# Patient Record
Sex: MALE | Race: WHITE
[De-identification: names, ages, dates, MRNs, and addresses within clinical notes are randomized per-mention and may not be internally consistent; named-entity substitution may affect disease eponyms.]

---

## 2017-01-01 ENCOUNTER — HOSPITAL ENCOUNTER (EMERGENCY)
Dept: HOSPITAL 84 - D.ER | Age: 42
Discharge: HOME | End: 2017-01-01
Payer: SELF-PAY

## 2017-01-01 DIAGNOSIS — N28.89: Primary | ICD-10-CM

## 2017-01-01 DIAGNOSIS — F17.200: ICD-10-CM

## 2017-01-01 LAB
APPEARANCE UR: (no result)
BACTERIA #/AREA URNS HPF: (no result) /HPF
BILIRUB SERPL-MCNC: NEGATIVE MG/DL
COLOR UR: (no result)
GLUCOSE SERPL-MCNC: NEGATIVE MG/DL
KETONES UR STRIP-MCNC: NEGATIVE MG/DL
LEUKOCYTE ESTERASE: (no result)
NITRITE UR-MCNC: NEGATIVE MG/ML
PH UR STRIP: 7 [PH] (ref 5–6)
PROT UR-MCNC: NEGATIVE MG/DL
RBC #/AREA URNS HPF: >50 /HPF (ref 0–5)
SP GR UR STRIP: 1.01 (ref 1–1.02)
SQUAMOUS #/AREA URNS HPF: (no result) /HPF (ref 0–5)
UROBILINOGEN UR-MCNC: NORMAL MG/DL
WBC #/AREA URNS HPF: (no result) /HPF (ref 0–5)

## 2017-03-21 ENCOUNTER — HOSPITAL ENCOUNTER (EMERGENCY)
Dept: HOSPITAL 84 - D.ER | Age: 42
Discharge: HOME | End: 2017-03-21
Payer: SELF-PAY

## 2017-03-21 DIAGNOSIS — I80.02: Primary | ICD-10-CM

## 2017-03-21 LAB
ALBUMIN SERPL-MCNC: 4 G/DL (ref 3.4–5)
ALP SERPL-CCNC: 98 U/L (ref 46–116)
ALT SERPL-CCNC: 22 U/L (ref 10–68)
ANION GAP SERPL CALC-SCNC: 13.5 MMOL/L (ref 8–16)
BASOPHILS NFR BLD AUTO: 0.4 % (ref 0–2)
BILIRUB SERPL-MCNC: 0.28 MG/DL (ref 0.2–1.3)
BUN SERPL-MCNC: 12 MG/DL (ref 7–18)
CALCIUM SERPL-MCNC: 9.1 MG/DL (ref 8.5–10.1)
CHLORIDE SERPL-SCNC: 104 MMOL/L (ref 98–107)
CO2 SERPL-SCNC: 27.8 MMOL/L (ref 21–32)
CREAT SERPL-MCNC: 0.8 MG/DL (ref 0.6–1.3)
CRP SERPL-MCNC: < 0.2 MG/DL (ref 0–0.9)
EOSINOPHIL NFR BLD: 2.1 % (ref 0–7)
ERYTHROCYTE [DISTWIDTH] IN BLOOD BY AUTOMATED COUNT: 12.9 % (ref 11.5–14.5)
GLOBULIN SER-MCNC: 3.9 G/L
GLUCOSE SERPL-MCNC: 88 MG/DL (ref 74–106)
HCT VFR BLD CALC: 45.1 % (ref 42–54)
HGB BLD-MCNC: 14.9 G/DL (ref 13.5–17.5)
IMM GRANULOCYTES NFR BLD: 0.3 % (ref 0–5)
LYMPHOCYTES NFR BLD AUTO: 41.2 % (ref 15–50)
MCH RBC QN AUTO: 30.7 PG (ref 26–34)
MCHC RBC AUTO-ENTMCNC: 33 G/DL (ref 31–37)
MCV RBC: 93 FL (ref 80–100)
MONOCYTES NFR BLD: 9 % (ref 2–11)
NEUTROPHILS NFR BLD AUTO: 47 % (ref 40–80)
OSMOLALITY SERPL CALC.SUM OF ELEC: 279 MOSM/KG (ref 275–300)
PLATELET # BLD: 207 10X3/UL (ref 130–400)
PMV BLD AUTO: 9.5 FL (ref 7.4–10.4)
POTASSIUM SERPL-SCNC: 4.3 MMOL/L (ref 3.5–5.1)
PROT SERPL-MCNC: 7.9 G/DL (ref 6.4–8.2)
RBC # BLD AUTO: 4.85 10X6/UL (ref 4.2–6.1)
SODIUM SERPL-SCNC: 141 MMOL/L (ref 136–145)
WBC # BLD AUTO: 6.8 10X3/UL (ref 4.8–10.8)

## 2017-04-27 ENCOUNTER — HOSPITAL ENCOUNTER (EMERGENCY)
Dept: HOSPITAL 84 - D.ER | Age: 42
Discharge: HOME | End: 2017-04-27
Payer: SELF-PAY

## 2017-04-27 DIAGNOSIS — F17.200: ICD-10-CM

## 2017-04-27 DIAGNOSIS — M54.5: Primary | ICD-10-CM

## 2017-04-27 DIAGNOSIS — S70.01XA: ICD-10-CM
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
## 2017-06-22 ENCOUNTER — HOSPITAL ENCOUNTER (EMERGENCY)
Dept: HOSPITAL 84 - D.ER | Age: 42
Discharge: HOME | End: 2017-06-22
Payer: SELF-PAY

## 2017-06-22 DIAGNOSIS — N23: Primary | ICD-10-CM

## 2017-06-22 LAB
ALBUMIN SERPL-MCNC: 3.3 G/DL (ref 3.4–5)
ALP SERPL-CCNC: 104 U/L (ref 46–116)
ALT SERPL-CCNC: 23 U/L (ref 10–68)
ANION GAP SERPL CALC-SCNC: 12.4 MMOL/L (ref 8–16)
APPEARANCE UR: (no result)
BACTERIA #/AREA URNS HPF: (no result) /HPF
BASOPHILS NFR BLD AUTO: 0.2 % (ref 0–2)
BILIRUB SERPL-MCNC: 0.5 MG/DL (ref 0.2–1.3)
BILIRUB SERPL-MCNC: NEGATIVE MG/DL
BUN SERPL-MCNC: 19 MG/DL (ref 7–18)
CALCIUM SERPL-MCNC: 8.2 MG/DL (ref 8.5–10.1)
CAOX CRY #/AREA URNS HPF: (no result) /HPF
CHLORIDE SERPL-SCNC: 101 MMOL/L (ref 98–107)
CO2 SERPL-SCNC: 25.9 MMOL/L (ref 21–32)
COLOR UR: YELLOW
CREAT SERPL-MCNC: 1 MG/DL (ref 0.6–1.3)
EOSINOPHIL NFR BLD: 1.4 % (ref 0–7)
ERYTHROCYTE [DISTWIDTH] IN BLOOD BY AUTOMATED COUNT: 13.6 % (ref 11.5–14.5)
GLOBULIN SER-MCNC: 3.3 G/L
GLUCOSE SERPL-MCNC: 88 MG/DL (ref 74–106)
GLUCOSE SERPL-MCNC: NEGATIVE MG/DL
HCT VFR BLD CALC: 42.8 % (ref 42–54)
HGB BLD-MCNC: 14.8 G/DL (ref 13.5–17.5)
IMM GRANULOCYTES NFR BLD: 0.1 % (ref 0–5)
KETONES UR STRIP-MCNC: NEGATIVE MG/DL
LEUKOCYTE ESTERASE: (no result)
LYMPHOCYTES NFR BLD AUTO: 26.8 % (ref 15–50)
MCH RBC QN AUTO: 32.3 PG (ref 26–34)
MCHC RBC AUTO-ENTMCNC: 34.6 G/DL (ref 31–37)
MCV RBC: 93.4 FL (ref 80–100)
MONOCYTES NFR BLD: 9.9 % (ref 2–11)
NEUTROPHILS NFR BLD AUTO: 61.6 % (ref 40–80)
NITRITE UR-MCNC: NEGATIVE MG/ML
OSMOLALITY SERPL CALC.SUM OF ELEC: 272 MOSM/KG (ref 275–300)
PH UR STRIP: 5 [PH] (ref 5–6)
PLATELET # BLD: 161 10X3/UL (ref 130–400)
PMV BLD AUTO: 9 FL (ref 7.4–10.4)
POTASSIUM SERPL-SCNC: 3.3 MMOL/L (ref 3.5–5.1)
PROT SERPL-MCNC: 6.6 G/DL (ref 6.4–8.2)
PROT UR-MCNC: NEGATIVE MG/DL
RBC # BLD AUTO: 4.58 10X6/UL (ref 4.2–6.1)
SODIUM SERPL-SCNC: 136 MMOL/L (ref 136–145)
SP GR UR STRIP: 1.03 (ref 1–1.02)
SQUAMOUS #/AREA URNS HPF: (no result) /HPF (ref 0–5)
UROBILINOGEN UR-MCNC: NORMAL MG/DL
WBC # BLD AUTO: 9.8 10X3/UL (ref 4.8–10.8)
WBC #/AREA URNS HPF: (no result) /HPF (ref 0–5)

## 2017-07-17 ENCOUNTER — HOSPITAL ENCOUNTER (EMERGENCY)
Dept: HOSPITAL 84 - D.ER | Age: 42
Discharge: HOME | End: 2017-07-17
Payer: SELF-PAY

## 2017-07-17 DIAGNOSIS — F17.200: ICD-10-CM

## 2017-07-17 DIAGNOSIS — R31.9: ICD-10-CM

## 2017-07-17 DIAGNOSIS — K59.00: Primary | ICD-10-CM

## 2017-07-17 LAB
ALBUMIN SERPL-MCNC: 3.2 G/DL (ref 3.4–5)
ALP SERPL-CCNC: 96 U/L (ref 46–116)
ALT SERPL-CCNC: 31 U/L (ref 10–68)
AMYLASE SERPL-CCNC: 42 U/L (ref 25–115)
ANION GAP SERPL CALC-SCNC: 12 MMOL/L (ref 8–16)
APPEARANCE UR: (no result)
BACTERIA #/AREA URNS HPF: (no result) /HPF
BASOPHILS NFR BLD AUTO: 0.3 % (ref 0–2)
BILIRUB SERPL-MCNC: 0.24 MG/DL (ref 0.2–1.3)
BILIRUB SERPL-MCNC: NEGATIVE MG/DL
BUN SERPL-MCNC: 18 MG/DL (ref 7–18)
CALCIUM SERPL-MCNC: 8.7 MG/DL (ref 8.5–10.1)
CAOX CRY #/AREA URNS HPF: (no result) /HPF
CHLORIDE SERPL-SCNC: 107 MMOL/L (ref 98–107)
CO2 SERPL-SCNC: 27.1 MMOL/L (ref 21–32)
COLOR UR: YELLOW
CREAT SERPL-MCNC: 0.9 MG/DL (ref 0.6–1.3)
EOSINOPHIL NFR BLD: 1.6 % (ref 0–7)
ERYTHROCYTE [DISTWIDTH] IN BLOOD BY AUTOMATED COUNT: 13 % (ref 11.5–14.5)
GLOBULIN SER-MCNC: 3.2 G/L
GLUCOSE SERPL-MCNC: 112 MG/DL (ref 74–106)
GLUCOSE SERPL-MCNC: NEGATIVE MG/DL
HCT VFR BLD CALC: 41.8 % (ref 42–54)
HGB BLD-MCNC: 14 G/DL (ref 13.5–17.5)
IMM GRANULOCYTES NFR BLD: 0.2 % (ref 0–5)
KETONES UR STRIP-MCNC: NEGATIVE MG/DL
LEUKOCYTE ESTERASE: NEGATIVE
LIPASE SERPL-CCNC: 108 U/L (ref 73–393)
LYMPHOCYTES NFR BLD AUTO: 19.8 % (ref 15–50)
MCH RBC QN AUTO: 31.7 PG (ref 26–34)
MCHC RBC AUTO-ENTMCNC: 33.5 G/DL (ref 31–37)
MCV RBC: 94.8 FL (ref 80–100)
MONOCYTES NFR BLD: 8 % (ref 2–11)
NEUTROPHILS NFR BLD AUTO: 70.1 % (ref 40–80)
NITRITE UR-MCNC: NEGATIVE MG/ML
OSMOLALITY SERPL CALC.SUM OF ELEC: 285 MOSM/KG (ref 275–300)
PH UR STRIP: 6 [PH] (ref 5–6)
PLATELET # BLD: 161 10X3/UL (ref 130–400)
PMV BLD AUTO: 9.5 FL (ref 7.4–10.4)
POTASSIUM SERPL-SCNC: 4.1 MMOL/L (ref 3.5–5.1)
PROT SERPL-MCNC: 6.4 G/DL (ref 6.4–8.2)
PROT UR-MCNC: NEGATIVE MG/DL
RBC # BLD AUTO: 4.41 10X6/UL (ref 4.2–6.1)
RBC #/AREA URNS HPF: >50 /HPF (ref 0–5)
SODIUM SERPL-SCNC: 142 MMOL/L (ref 136–145)
SP GR UR STRIP: 1.01 (ref 1–1.02)
SQUAMOUS #/AREA URNS HPF: (no result) /HPF (ref 0–5)
UROBILINOGEN UR-MCNC: NORMAL MG/DL
WBC # BLD AUTO: 6.2 10X3/UL (ref 4.8–10.8)
WBC #/AREA URNS HPF: (no result) /HPF (ref 0–5)

## 2017-07-25 ENCOUNTER — HOSPITAL ENCOUNTER (EMERGENCY)
Dept: HOSPITAL 84 - D.ER | Age: 42
Discharge: HOME | End: 2017-07-25
Payer: MEDICAID

## 2017-07-25 DIAGNOSIS — R10.9: Primary | ICD-10-CM

## 2017-07-25 DIAGNOSIS — R60.0: ICD-10-CM

## 2017-08-12 ENCOUNTER — HOSPITAL ENCOUNTER (EMERGENCY)
Dept: HOSPITAL 84 - D.ER | Age: 42
Discharge: HOME | End: 2017-08-12
Payer: MEDICAID

## 2017-08-12 DIAGNOSIS — F17.200: ICD-10-CM

## 2017-08-12 DIAGNOSIS — R10.9: Primary | ICD-10-CM

## 2017-08-12 DIAGNOSIS — K40.90: ICD-10-CM

## 2017-08-24 ENCOUNTER — HOSPITAL ENCOUNTER (EMERGENCY)
Dept: HOSPITAL 84 - D.ER | Age: 42
Discharge: HOME | End: 2017-08-24
Payer: MEDICAID

## 2017-08-24 DIAGNOSIS — K40.90: Primary | ICD-10-CM

## 2017-08-24 DIAGNOSIS — R10.30: ICD-10-CM

## 2017-08-24 DIAGNOSIS — F17.200: ICD-10-CM

## 2017-08-28 ENCOUNTER — HOSPITAL ENCOUNTER (EMERGENCY)
Dept: HOSPITAL 84 - D.ER | Age: 42
Discharge: HOME | End: 2017-08-28
Payer: MEDICAID

## 2017-08-28 DIAGNOSIS — F17.200: ICD-10-CM

## 2017-08-28 DIAGNOSIS — I82.402: Primary | ICD-10-CM

## 2017-08-28 LAB
ANION GAP SERPL CALC-SCNC: 14.3 MMOL/L (ref 8–16)
APTT BLD: 28.1 SECONDS (ref 22.8–39.4)
BASOPHILS NFR BLD AUTO: 0.4 % (ref 0–2)
BUN SERPL-MCNC: 15 MG/DL (ref 7–18)
CALCIUM SERPL-MCNC: 8.6 MG/DL (ref 8.5–10.1)
CHLORIDE SERPL-SCNC: 105 MMOL/L (ref 98–107)
CO2 SERPL-SCNC: 24.5 MMOL/L (ref 21–32)
CREAT SERPL-MCNC: 0.7 MG/DL (ref 0.6–1.3)
EOSINOPHIL NFR BLD: 2.4 % (ref 0–7)
ERYTHROCYTE [DISTWIDTH] IN BLOOD BY AUTOMATED COUNT: 12.6 % (ref 11.5–14.5)
GLUCOSE SERPL-MCNC: 96 MG/DL (ref 74–106)
HCT VFR BLD CALC: 35.2 % (ref 42–54)
HGB BLD-MCNC: 11.7 G/DL (ref 13.5–17.5)
IMM GRANULOCYTES NFR BLD: 0.2 % (ref 0–5)
INR PPP: 0.98 (ref 0.85–1.17)
LYMPHOCYTES NFR BLD AUTO: 37.9 % (ref 15–50)
MCH RBC QN AUTO: 31.2 PG (ref 26–34)
MCHC RBC AUTO-ENTMCNC: 33.2 G/DL (ref 31–37)
MCV RBC: 93.9 FL (ref 80–100)
MONOCYTES NFR BLD: 8.3 % (ref 2–11)
NEUTROPHILS NFR BLD AUTO: 50.8 % (ref 40–80)
OSMOLALITY SERPL CALC.SUM OF ELEC: 279 MOSM/KG (ref 275–300)
PLATELET # BLD: 143 10X3/UL (ref 130–400)
PMV BLD AUTO: 9.1 FL (ref 7.4–10.4)
POTASSIUM SERPL-SCNC: 3.8 MMOL/L (ref 3.5–5.1)
PROTHROMBIN TIME: 12.8 SECONDS (ref 11.6–15)
RBC # BLD AUTO: 3.75 10X6/UL (ref 4.2–6.1)
SODIUM SERPL-SCNC: 140 MMOL/L (ref 136–145)
WBC # BLD AUTO: 5.5 10X3/UL (ref 4.8–10.8)

## 2017-09-23 ENCOUNTER — HOSPITAL ENCOUNTER (EMERGENCY)
Dept: HOSPITAL 84 - D.ER | Age: 42
Discharge: HOME | End: 2017-09-23
Payer: MEDICAID

## 2017-09-23 DIAGNOSIS — K40.90: Primary | ICD-10-CM

## 2017-09-23 DIAGNOSIS — F17.200: ICD-10-CM

## 2017-10-04 ENCOUNTER — HOSPITAL ENCOUNTER (EMERGENCY)
Dept: HOSPITAL 84 - D.ER | Age: 42
Discharge: HOME | End: 2017-10-04
Payer: MEDICAID

## 2017-10-04 DIAGNOSIS — S20.219A: Primary | ICD-10-CM

## 2017-10-04 DIAGNOSIS — F17.200: ICD-10-CM
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
## 2017-10-04 DIAGNOSIS — R07.89: ICD-10-CM
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
## 2017-10-07 ENCOUNTER — HOSPITAL ENCOUNTER (EMERGENCY)
Dept: HOSPITAL 84 - D.ER | Age: 42
Discharge: HOME | End: 2017-10-07
Payer: MEDICAID

## 2017-10-07 ENCOUNTER — HOSPITAL ENCOUNTER (EMERGENCY)
Dept: HOSPITAL 84 - D.ER | Age: 42
LOS: 1 days | Discharge: HOME | End: 2017-10-08
Payer: MEDICAID

## 2017-10-07 DIAGNOSIS — D68.51: ICD-10-CM

## 2017-10-07 DIAGNOSIS — K21.9: Primary | ICD-10-CM

## 2017-10-07 DIAGNOSIS — F17.200: ICD-10-CM

## 2017-10-07 DIAGNOSIS — K59.00: Primary | ICD-10-CM

## 2017-10-07 LAB
AMYLASE SERPL-CCNC: 25 U/L (ref 25–115)
ANION GAP SERPL CALC-SCNC: 15 MMOL/L (ref 8–16)
BASOPHILS NFR BLD AUTO: 0.1 % (ref 0–2)
BUN SERPL-MCNC: 8 MG/DL (ref 7–18)
CALCIUM SERPL-MCNC: 8.8 MG/DL (ref 8.5–10.1)
CHLORIDE SERPL-SCNC: 107 MMOL/L (ref 98–107)
CO2 SERPL-SCNC: 23.4 MMOL/L (ref 21–32)
CREAT SERPL-MCNC: 0.9 MG/DL (ref 0.6–1.3)
EOSINOPHIL NFR BLD: 3.3 % (ref 0–7)
ERYTHROCYTE [DISTWIDTH] IN BLOOD BY AUTOMATED COUNT: 12.9 % (ref 11.5–14.5)
GLUCOSE SERPL-MCNC: 87 MG/DL (ref 74–106)
HCT VFR BLD CALC: 39.2 % (ref 42–54)
HGB BLD-MCNC: 13.2 G/DL (ref 13.5–17.5)
IMM GRANULOCYTES NFR BLD: 0.1 % (ref 0–5)
LIPASE SERPL-CCNC: 86 U/L (ref 73–393)
LYMPHOCYTES NFR BLD AUTO: 22.5 % (ref 15–50)
MCH RBC QN AUTO: 31.1 PG (ref 26–34)
MCHC RBC AUTO-ENTMCNC: 33.7 G/DL (ref 31–37)
MCV RBC: 92.2 FL (ref 80–100)
MONOCYTES NFR BLD: 8.6 % (ref 2–11)
NEUTROPHILS NFR BLD AUTO: 65.4 % (ref 40–80)
OSMOLALITY SERPL CALC.SUM OF ELEC: 279 MOSM/KG (ref 275–300)
PLATELET # BLD: 151 10X3/UL (ref 130–400)
PMV BLD AUTO: 9 FL (ref 7.4–10.4)
POTASSIUM SERPL-SCNC: 3.4 MMOL/L (ref 3.5–5.1)
RBC # BLD AUTO: 4.25 10X6/UL (ref 4.2–6.1)
SODIUM SERPL-SCNC: 142 MMOL/L (ref 136–145)
WBC # BLD AUTO: 7.2 10X3/UL (ref 4.8–10.8)

## 2017-10-08 LAB
BASOPHILS NFR BLD AUTO: 0.3 % (ref 0–2)
EOSINOPHIL NFR BLD: 2.9 % (ref 0–7)
ERYTHROCYTE [DISTWIDTH] IN BLOOD BY AUTOMATED COUNT: 12.7 % (ref 11.5–14.5)
HCT VFR BLD CALC: 40.4 % (ref 42–54)
HGB BLD-MCNC: 13.5 G/DL (ref 13.5–17.5)
IMM GRANULOCYTES NFR BLD: 0.2 % (ref 0–5)
LYMPHOCYTES NFR BLD AUTO: 28 % (ref 15–50)
MCH RBC QN AUTO: 31 PG (ref 26–34)
MCHC RBC AUTO-ENTMCNC: 33.4 G/DL (ref 31–37)
MCV RBC: 92.9 FL (ref 80–100)
MONOCYTES NFR BLD: 7.6 % (ref 2–11)
NEUTROPHILS NFR BLD AUTO: 61 % (ref 40–80)
PLATELET # BLD: 142 10X3/UL (ref 130–400)
PMV BLD AUTO: 9.1 FL (ref 7.4–10.4)
RBC # BLD AUTO: 4.35 10X6/UL (ref 4.2–6.1)
WBC # BLD AUTO: 6.3 10X3/UL (ref 4.8–10.8)

## 2017-11-28 ENCOUNTER — HOSPITAL ENCOUNTER (EMERGENCY)
Dept: HOSPITAL 84 - D.ER | Age: 42
LOS: 1 days | Discharge: HOME | End: 2017-11-29
Payer: MEDICAID

## 2017-11-28 DIAGNOSIS — N39.0: ICD-10-CM

## 2017-11-28 DIAGNOSIS — F17.200: ICD-10-CM

## 2017-11-28 DIAGNOSIS — R10.9: Primary | ICD-10-CM

## 2017-11-28 DIAGNOSIS — N23: ICD-10-CM

## 2017-11-28 LAB
ALBUMIN SERPL-MCNC: 3.8 G/DL (ref 3.4–5)
ALP SERPL-CCNC: 127 U/L (ref 46–116)
ALT SERPL-CCNC: 23 U/L (ref 10–68)
ANION GAP SERPL CALC-SCNC: 14 MMOL/L (ref 8–16)
APPEARANCE UR: (no result)
BACTERIA #/AREA URNS HPF: (no result) /HPF
BASOPHILS NFR BLD AUTO: 0.7 % (ref 0–2)
BILIRUB SERPL-MCNC: 0.19 MG/DL (ref 0.2–1.3)
BILIRUB SERPL-MCNC: NEGATIVE MG/DL
BUN SERPL-MCNC: 15 MG/DL (ref 7–18)
CALCIUM SERPL-MCNC: 9.1 MG/DL (ref 8.5–10.1)
CHLORIDE SERPL-SCNC: 102 MMOL/L (ref 98–107)
CO2 SERPL-SCNC: 26.6 MMOL/L (ref 21–32)
COLOR UR: (no result)
CREAT SERPL-MCNC: 0.8 MG/DL (ref 0.6–1.3)
EOSINOPHIL NFR BLD: 2 % (ref 0–7)
ERYTHROCYTE [DISTWIDTH] IN BLOOD BY AUTOMATED COUNT: 13 % (ref 11.5–14.5)
GLOBULIN SER-MCNC: 4 G/L
GLUCOSE SERPL-MCNC: 56 MG/DL (ref 74–106)
GLUCOSE SERPL-MCNC: NEGATIVE MG/DL
HCT VFR BLD CALC: 42.2 % (ref 42–54)
HGB BLD-MCNC: 14.1 G/DL (ref 13.5–17.5)
IMM GRANULOCYTES NFR BLD: 0.1 % (ref 0–5)
KETONES UR STRIP-MCNC: NEGATIVE MG/DL
LYMPHOCYTES NFR BLD AUTO: 29.5 % (ref 15–50)
MCH RBC QN AUTO: 31.2 PG (ref 26–34)
MCHC RBC AUTO-ENTMCNC: 33.4 G/DL (ref 31–37)
MCV RBC: 93.4 FL (ref 80–100)
MONOCYTES NFR BLD: 8.9 % (ref 2–11)
NEUTROPHILS NFR BLD AUTO: 58.8 % (ref 40–80)
NITRITE UR-MCNC: NEGATIVE MG/ML
OSMOLALITY SERPL CALC.SUM OF ELEC: 276 MOSM/KG (ref 275–300)
PH UR STRIP: 5 [PH] (ref 5–6)
PLATELET # BLD: 204 10X3/UL (ref 130–400)
PMV BLD AUTO: 9 FL (ref 7.4–10.4)
POTASSIUM SERPL-SCNC: 3.6 MMOL/L (ref 3.5–5.1)
PROT SERPL-MCNC: 7.8 G/DL (ref 6.4–8.2)
PROT UR-MCNC: (no result) MG/DL
RBC # BLD AUTO: 4.52 10X6/UL (ref 4.2–6.1)
RBC #/AREA URNS HPF: >50 /HPF (ref 0–5)
SODIUM SERPL-SCNC: 139 MMOL/L (ref 136–145)
SP GR UR STRIP: 1.02 (ref 1–1.02)
UROBILINOGEN UR-MCNC: NORMAL MG/DL
WBC # BLD AUTO: 6.9 10X3/UL (ref 4.8–10.8)
WBC #/AREA URNS HPF: (no result) /HPF (ref 0–5)

## 2017-12-31 ENCOUNTER — HOSPITAL ENCOUNTER (EMERGENCY)
Dept: HOSPITAL 84 - D.ER | Age: 42
Discharge: HOME | End: 2017-12-31
Payer: MEDICAID

## 2017-12-31 DIAGNOSIS — D68.51: ICD-10-CM

## 2017-12-31 DIAGNOSIS — K40.90: Primary | ICD-10-CM

## 2017-12-31 DIAGNOSIS — F17.200: ICD-10-CM

## 2017-12-31 LAB
APPEARANCE UR: CLEAR
BILIRUB SERPL-MCNC: NEGATIVE MG/DL
COLOR UR: YELLOW
GLUCOSE SERPL-MCNC: NEGATIVE MG/DL
KETONES UR STRIP-MCNC: NEGATIVE MG/DL
NITRITE UR-MCNC: NEGATIVE MG/ML
PH UR STRIP: 7 [PH] (ref 5–6)
PROT UR-MCNC: NEGATIVE MG/DL
SP GR UR STRIP: 1.01 (ref 1–1.02)
UROBILINOGEN UR-MCNC: NORMAL MG/DL

## 2018-01-02 ENCOUNTER — HOSPITAL ENCOUNTER (EMERGENCY)
Dept: HOSPITAL 84 - D.ER | Age: 43
Discharge: HOME | End: 2018-01-02
Payer: MEDICAID

## 2018-01-02 DIAGNOSIS — R10.32: ICD-10-CM

## 2018-01-02 DIAGNOSIS — R10.9: Primary | ICD-10-CM

## 2018-01-02 DIAGNOSIS — K40.90: ICD-10-CM

## 2018-01-02 DIAGNOSIS — F17.200: ICD-10-CM

## 2018-01-02 LAB
ALBUMIN SERPL-MCNC: 3.9 G/DL (ref 3.4–5)
ALP SERPL-CCNC: 121 U/L (ref 46–116)
ALT SERPL-CCNC: 19 U/L (ref 10–68)
ANION GAP SERPL CALC-SCNC: 15.6 MMOL/L (ref 8–16)
BASOPHILS NFR BLD AUTO: 0.1 % (ref 0–2)
BILIRUB SERPL-MCNC: 0.66 MG/DL (ref 0.2–1.3)
BUN SERPL-MCNC: 9 MG/DL (ref 7–18)
CALCIUM SERPL-MCNC: 9.7 MG/DL (ref 8.5–10.1)
CHLORIDE SERPL-SCNC: 102 MMOL/L (ref 98–107)
CO2 SERPL-SCNC: 26.2 MMOL/L (ref 21–32)
CREAT SERPL-MCNC: 0.9 MG/DL (ref 0.6–1.3)
EOSINOPHIL NFR BLD: 0.7 % (ref 0–7)
ERYTHROCYTE [DISTWIDTH] IN BLOOD BY AUTOMATED COUNT: 13 % (ref 11.5–14.5)
GLOBULIN SER-MCNC: 4.2 G/L
GLUCOSE SERPL-MCNC: 97 MG/DL (ref 74–106)
HCT VFR BLD CALC: 43.3 % (ref 42–54)
HGB BLD-MCNC: 14.8 G/DL (ref 13.5–17.5)
IMM GRANULOCYTES NFR BLD: 0.2 % (ref 0–5)
INR PPP: 1 (ref 0.85–1.17)
LYMPHOCYTES NFR BLD AUTO: 25 % (ref 15–50)
MCH RBC QN AUTO: 30.6 PG (ref 26–34)
MCHC RBC AUTO-ENTMCNC: 34.2 G/DL (ref 31–37)
MCV RBC: 89.5 FL (ref 80–100)
MONOCYTES NFR BLD: 7.6 % (ref 2–11)
NEUTROPHILS NFR BLD AUTO: 66.4 % (ref 40–80)
OSMOLALITY SERPL CALC.SUM OF ELEC: 277 MOSM/KG (ref 275–300)
PLATELET # BLD: 233 10X3/UL (ref 130–400)
PMV BLD AUTO: 8.7 FL (ref 7.4–10.4)
POTASSIUM SERPL-SCNC: 3.8 MMOL/L (ref 3.5–5.1)
PROT SERPL-MCNC: 8.1 G/DL (ref 6.4–8.2)
PROTHROMBIN TIME: 12.8 SECONDS (ref 11.6–15)
RBC # BLD AUTO: 4.84 10X6/UL (ref 4.2–6.1)
SODIUM SERPL-SCNC: 140 MMOL/L (ref 136–145)
WBC # BLD AUTO: 8.8 10X3/UL (ref 4.8–10.8)

## 2018-01-06 ENCOUNTER — HOSPITAL ENCOUNTER (EMERGENCY)
Dept: HOSPITAL 84 - D.ER | Age: 43
Discharge: HOME | End: 2018-01-06
Payer: MEDICAID

## 2018-01-06 DIAGNOSIS — F17.200: ICD-10-CM

## 2018-01-06 DIAGNOSIS — N39.0: Primary | ICD-10-CM

## 2018-01-06 LAB
ALBUMIN SERPL-MCNC: 3.5 G/DL (ref 3.4–5)
ALP SERPL-CCNC: 111 U/L (ref 46–116)
ALT SERPL-CCNC: 16 U/L (ref 10–68)
AMPHETAMINES UR QL SCN: POSITIVE QUAL
ANION GAP SERPL CALC-SCNC: 14.6 MMOL/L (ref 8–16)
APPEARANCE UR: (no result)
BACTERIA #/AREA URNS HPF: (no result) /HPF
BARBITURATES UR QL SCN: NEGATIVE QUAL
BASOPHILS NFR BLD AUTO: 0.3 % (ref 0–2)
BENZODIAZ UR QL SCN: NEGATIVE QUAL
BILIRUB SERPL-MCNC: 0.32 MG/DL (ref 0.2–1.3)
BILIRUB SERPL-MCNC: NEGATIVE MG/DL
BUN SERPL-MCNC: 10 MG/DL (ref 7–18)
BZE UR QL SCN: NEGATIVE QUAL
CALCIUM SERPL-MCNC: 9.2 MG/DL (ref 8.5–10.1)
CANNABINOIDS UR QL SCN: POSITIVE QUAL
CHLORIDE SERPL-SCNC: 104 MMOL/L (ref 98–107)
CO2 SERPL-SCNC: 25.4 MMOL/L (ref 21–32)
COLOR UR: (no result)
CREAT SERPL-MCNC: 0.8 MG/DL (ref 0.6–1.3)
EOSINOPHIL NFR BLD: 0.9 % (ref 0–7)
ERYTHROCYTE [DISTWIDTH] IN BLOOD BY AUTOMATED COUNT: 13 % (ref 11.5–14.5)
GLOBULIN SER-MCNC: 4.1 G/L
GLUCOSE SERPL-MCNC: 92 MG/DL (ref 74–106)
GLUCOSE SERPL-MCNC: NEGATIVE MG/DL
HCT VFR BLD CALC: 40.6 % (ref 42–54)
HGB BLD-MCNC: 13.8 G/DL (ref 13.5–17.5)
IMM GRANULOCYTES NFR BLD: 0.1 % (ref 0–5)
INR PPP: 1.04 (ref 0.85–1.17)
KETONES UR STRIP-MCNC: NEGATIVE MG/DL
LYMPHOCYTES NFR BLD AUTO: 26.1 % (ref 15–50)
MCH RBC QN AUTO: 30.5 PG (ref 26–34)
MCHC RBC AUTO-ENTMCNC: 34 G/DL (ref 31–37)
MCV RBC: 89.6 FL (ref 80–100)
MONOCYTES NFR BLD: 8.7 % (ref 2–11)
NEUTROPHILS NFR BLD AUTO: 63.9 % (ref 40–80)
NITRITE UR-MCNC: POSITIVE MG/ML
OPIATES UR QL SCN: POSITIVE QUAL
OSMOLALITY SERPL CALC.SUM OF ELEC: 277 MOSM/KG (ref 275–300)
PCP UR QL SCN: NEGATIVE QUAL
PH UR STRIP: 5 [PH] (ref 5–6)
PLATELET # BLD: 220 10X3/UL (ref 130–400)
PMV BLD AUTO: 8.8 FL (ref 7.4–10.4)
POTASSIUM SERPL-SCNC: 4 MMOL/L (ref 3.5–5.1)
PROT SERPL-MCNC: 7.6 G/DL (ref 6.4–8.2)
PROT UR-MCNC: (no result) MG/DL
PROTHROMBIN TIME: 13.2 SECONDS (ref 11.6–15)
RBC # BLD AUTO: 4.53 10X6/UL (ref 4.2–6.1)
RBC #/AREA URNS HPF: >50 /HPF (ref 0–5)
SODIUM SERPL-SCNC: 140 MMOL/L (ref 136–145)
SP GR UR STRIP: 1.02 (ref 1–1.02)
SQUAMOUS #/AREA URNS HPF: (no result) /HPF (ref 0–5)
UROBILINOGEN UR-MCNC: 8 MG/DL
WBC # BLD AUTO: 7.5 10X3/UL (ref 4.8–10.8)
WBC #/AREA URNS HPF: (no result) /HPF (ref 0–5)

## 2018-01-26 ENCOUNTER — HOSPITAL ENCOUNTER (EMERGENCY)
Dept: HOSPITAL 84 - D.ER | Age: 43
Discharge: HOME | End: 2018-01-26
Payer: MEDICAID

## 2018-01-26 DIAGNOSIS — R31.9: ICD-10-CM

## 2018-01-26 DIAGNOSIS — R10.9: Primary | ICD-10-CM

## 2018-01-26 DIAGNOSIS — F17.200: ICD-10-CM

## 2018-01-26 LAB
ALBUMIN SERPL-MCNC: 3.8 G/DL (ref 3.4–5)
ALP SERPL-CCNC: 97 U/L (ref 46–116)
ALT SERPL-CCNC: 16 U/L (ref 10–68)
AMORPHOUS SEDIMENT: (no result) /LPF
AMYLASE SERPL-CCNC: 43 U/L (ref 25–115)
ANION GAP SERPL CALC-SCNC: 12.2 MMOL/L (ref 8–16)
APPEARANCE UR: (no result)
BACTERIA #/AREA URNS HPF: (no result) /HPF
BASOPHILS NFR BLD AUTO: 0.1 % (ref 0–2)
BILIRUB SERPL-MCNC: 0.42 MG/DL (ref 0.2–1.3)
BILIRUB SERPL-MCNC: NEGATIVE MG/DL
BUN SERPL-MCNC: 12 MG/DL (ref 7–18)
CALCIUM SERPL-MCNC: 9.3 MG/DL (ref 8.5–10.1)
CAOX CRY #/AREA URNS HPF: (no result) /HPF
CHLORIDE SERPL-SCNC: 104 MMOL/L (ref 98–107)
CO2 SERPL-SCNC: 28.6 MMOL/L (ref 21–32)
COLOR UR: (no result)
CREAT SERPL-MCNC: 1 MG/DL (ref 0.6–1.3)
EOSINOPHIL NFR BLD: 0.4 % (ref 0–7)
ERYTHROCYTE [DISTWIDTH] IN BLOOD BY AUTOMATED COUNT: 13.1 % (ref 11.5–14.5)
GLOBULIN SER-MCNC: 3.6 G/L
GLUCOSE SERPL-MCNC: 107 MG/DL (ref 74–106)
GLUCOSE SERPL-MCNC: NEGATIVE MG/DL
GRAN CASTS #/AREA URNS LPF: (no result) /LPF
HCT VFR BLD CALC: 43.5 % (ref 42–54)
HGB BLD-MCNC: 14.8 G/DL (ref 13.5–17.5)
IMM GRANULOCYTES NFR BLD: 0.3 % (ref 0–5)
KETONES UR STRIP-MCNC: NEGATIVE MG/DL
LIPASE SERPL-CCNC: 85 U/L (ref 73–393)
LYMPHOCYTES NFR BLD AUTO: 28.9 % (ref 15–50)
MCH RBC QN AUTO: 30.7 PG (ref 26–34)
MCHC RBC AUTO-ENTMCNC: 34 G/DL (ref 31–37)
MCV RBC: 90.2 FL (ref 80–100)
MONOCYTES NFR BLD: 7 % (ref 2–11)
MUCOUS THREADS #/AREA URNS LPF: (no result) /LPF
NEUTROPHILS NFR BLD AUTO: 63.3 % (ref 40–80)
NITRITE UR-MCNC: NEGATIVE MG/ML
OSMOLALITY SERPL CALC.SUM OF ELEC: 280 MOSM/KG (ref 275–300)
PH UR STRIP: 7 [PH] (ref 5–6)
PLATELET # BLD: 156 10X3/UL (ref 130–400)
PMV BLD AUTO: 8.7 FL (ref 7.4–10.4)
POTASSIUM SERPL-SCNC: 3.8 MMOL/L (ref 3.5–5.1)
PROT SERPL-MCNC: 7.4 G/DL (ref 6.4–8.2)
PROT UR-MCNC: NEGATIVE MG/DL
RBC # BLD AUTO: 4.82 10X6/UL (ref 4.2–6.1)
RBC #/AREA URNS HPF: >50 /HPF (ref 0–5)
SODIUM SERPL-SCNC: 141 MMOL/L (ref 136–145)
SP GR UR STRIP: 1.01 (ref 1–1.02)
SQUAMOUS #/AREA URNS HPF: (no result) /HPF (ref 0–5)
UROBILINOGEN UR-MCNC: NORMAL MG/DL
WBC # BLD AUTO: 7.8 10X3/UL (ref 4.8–10.8)
WBC #/AREA URNS HPF: (no result) /HPF (ref 0–5)

## 2018-03-18 ENCOUNTER — HOSPITAL ENCOUNTER (EMERGENCY)
Dept: HOSPITAL 84 - D.ER | Age: 43
Discharge: HOME | End: 2018-03-18
Payer: MEDICAID

## 2018-03-18 DIAGNOSIS — I82.402: ICD-10-CM

## 2018-03-18 DIAGNOSIS — M79.605: Primary | ICD-10-CM

## 2018-04-10 ENCOUNTER — HOSPITAL ENCOUNTER (EMERGENCY)
Dept: HOSPITAL 84 - D.ER | Age: 43
Discharge: HOME | End: 2018-04-10
Payer: MEDICAID

## 2018-04-10 DIAGNOSIS — K02.9: Primary | ICD-10-CM

## 2018-04-10 DIAGNOSIS — F17.200: ICD-10-CM

## 2018-04-10 DIAGNOSIS — K04.7: ICD-10-CM

## 2018-04-10 DIAGNOSIS — R22.9: ICD-10-CM

## 2018-04-10 DIAGNOSIS — K08.89: ICD-10-CM

## 2018-04-18 ENCOUNTER — HOSPITAL ENCOUNTER (EMERGENCY)
Dept: HOSPITAL 84 - D.ER | Age: 43
Discharge: HOME | End: 2018-04-18
Payer: MEDICAID
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
## 2018-04-18 DIAGNOSIS — F17.200: ICD-10-CM
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
## 2018-04-18 DIAGNOSIS — S83.92XA: Primary | ICD-10-CM

## 2018-05-31 ENCOUNTER — HOSPITAL ENCOUNTER (EMERGENCY)
Dept: HOSPITAL 84 - D.ER | Age: 43
Discharge: HOME | End: 2018-05-31
Payer: SELF-PAY

## 2018-05-31 DIAGNOSIS — L03.116: Primary | ICD-10-CM

## 2018-05-31 LAB
ALBUMIN SERPL-MCNC: 3.4 G/DL (ref 3.4–5)
ALP SERPL-CCNC: 96 U/L (ref 46–116)
ALT SERPL-CCNC: 29 U/L (ref 10–68)
ANION GAP SERPL CALC-SCNC: 16.7 MMOL/L (ref 8–16)
BASOPHILS NFR BLD AUTO: 0.5 % (ref 0–2)
BILIRUB SERPL-MCNC: 0.2 MG/DL (ref 0.2–1.3)
BUN SERPL-MCNC: 19 MG/DL (ref 7–18)
CALCIUM SERPL-MCNC: 8.3 MG/DL (ref 8.5–10.1)
CHLORIDE SERPL-SCNC: 106 MMOL/L (ref 98–107)
CO2 SERPL-SCNC: 23 MMOL/L (ref 21–32)
CREAT SERPL-MCNC: 0.8 MG/DL (ref 0.6–1.3)
CRP SERPL-MCNC: < 0.2 MG/DL (ref 0–0.9)
EOSINOPHIL NFR BLD: 4.5 % (ref 0–7)
ERYTHROCYTE [DISTWIDTH] IN BLOOD BY AUTOMATED COUNT: 12.7 % (ref 11.5–14.5)
GLOBULIN SER-MCNC: 3.6 G/L
GLUCOSE SERPL-MCNC: 94 MG/DL (ref 74–106)
HCT VFR BLD CALC: 39.3 % (ref 42–54)
HGB BLD-MCNC: 13.3 G/DL (ref 13.5–17.5)
IMM GRANULOCYTES NFR BLD: 0.2 % (ref 0–5)
LYMPHOCYTES NFR BLD AUTO: 35.9 % (ref 15–50)
MCH RBC QN AUTO: 31.3 PG (ref 26–34)
MCHC RBC AUTO-ENTMCNC: 33.8 G/DL (ref 31–37)
MCV RBC: 92.5 FL (ref 80–100)
MONOCYTES NFR BLD: 7.7 % (ref 2–11)
NEUTROPHILS NFR BLD AUTO: 51.2 % (ref 40–80)
OSMOLALITY SERPL CALC.SUM OF ELEC: 284 MOSM/KG (ref 275–300)
PLATELET # BLD: 163 10X3/UL (ref 130–400)
PMV BLD AUTO: 8.9 FL (ref 7.4–10.4)
POTASSIUM SERPL-SCNC: 3.7 MMOL/L (ref 3.5–5.1)
PROT SERPL-MCNC: 7 G/DL (ref 6.4–8.2)
RBC # BLD AUTO: 4.25 10X6/UL (ref 4.2–6.1)
SODIUM SERPL-SCNC: 142 MMOL/L (ref 136–145)
WBC # BLD AUTO: 5.6 10X3/UL (ref 4.8–10.8)

## 2018-08-06 ENCOUNTER — HOSPITAL ENCOUNTER (EMERGENCY)
Dept: HOSPITAL 84 - D.ER | Age: 43
Discharge: HOME | End: 2018-08-06
Payer: MEDICAID

## 2018-08-06 VITALS — HEIGHT: 72 IN | BODY MASS INDEX: 23.7 KG/M2 | WEIGHT: 175 LBS

## 2018-08-06 VITALS — DIASTOLIC BLOOD PRESSURE: 78 MMHG | SYSTOLIC BLOOD PRESSURE: 122 MMHG

## 2018-08-06 DIAGNOSIS — K04.7: ICD-10-CM

## 2018-08-06 DIAGNOSIS — K02.9: Primary | ICD-10-CM

## 2018-08-06 DIAGNOSIS — F17.200: ICD-10-CM

## 2018-10-07 ENCOUNTER — HOSPITAL ENCOUNTER (EMERGENCY)
Dept: HOSPITAL 84 - D.ER | Age: 43
Discharge: HOME | End: 2018-10-07
Payer: MEDICAID

## 2018-10-07 VITALS — HEIGHT: 72 IN | BODY MASS INDEX: 23.75 KG/M2 | WEIGHT: 175.37 LBS

## 2018-10-07 VITALS — SYSTOLIC BLOOD PRESSURE: 140 MMHG | DIASTOLIC BLOOD PRESSURE: 84 MMHG

## 2018-10-07 DIAGNOSIS — S02.5XXA: Primary | ICD-10-CM
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
## 2018-10-07 DIAGNOSIS — K02.9: ICD-10-CM
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
## 2019-02-19 ENCOUNTER — HOSPITAL ENCOUNTER (EMERGENCY)
Dept: HOSPITAL 84 - D.ER | Age: 44
Discharge: HOME | End: 2019-02-19
Payer: SELF-PAY

## 2019-02-19 VITALS
BODY MASS INDEX: 24.43 KG/M2 | BODY MASS INDEX: 24.43 KG/M2 | WEIGHT: 180.38 LBS | HEIGHT: 72 IN | HEIGHT: 72 IN | WEIGHT: 180.38 LBS

## 2019-02-19 VITALS — SYSTOLIC BLOOD PRESSURE: 171 MMHG | DIASTOLIC BLOOD PRESSURE: 108 MMHG

## 2019-02-19 DIAGNOSIS — F10.129: ICD-10-CM

## 2019-02-19 DIAGNOSIS — F19.10: ICD-10-CM

## 2019-02-19 DIAGNOSIS — R10.11: Primary | ICD-10-CM

## 2019-02-19 LAB
ALBUMIN SERPL-MCNC: 3.9 G/DL (ref 3.4–5)
ALP SERPL-CCNC: 128 U/L (ref 46–116)
ALT SERPL-CCNC: 23 U/L (ref 10–68)
AMPHETAMINES UR QL SCN: POSITIVE QUAL
AMYLASE SERPL-CCNC: 41 U/L (ref 25–115)
ANION GAP SERPL CALC-SCNC: 19.1 MMOL/L (ref 8–16)
APPEARANCE UR: CLEAR
BARBITURATES UR QL SCN: NEGATIVE QUAL
BASOPHILS NFR BLD AUTO: 0.6 % (ref 0–2)
BENZODIAZ UR QL SCN: NEGATIVE QUAL
BILIRUB SERPL-MCNC: 0.25 MG/DL (ref 0.2–1.3)
BILIRUB SERPL-MCNC: NEGATIVE MG/DL
BUN SERPL-MCNC: 14 MG/DL (ref 7–18)
BZE UR QL SCN: NEGATIVE QUAL
CALCIUM SERPL-MCNC: 8.8 MG/DL (ref 8.5–10.1)
CANNABINOIDS UR QL SCN: POSITIVE QUAL
CHLORIDE SERPL-SCNC: 104 MMOL/L (ref 98–107)
CO2 SERPL-SCNC: 23.6 MMOL/L (ref 21–32)
COLOR UR: YELLOW
CREAT SERPL-MCNC: 0.8 MG/DL (ref 0.6–1.3)
EOSINOPHIL NFR BLD: 1.9 % (ref 0–7)
ERYTHROCYTE [DISTWIDTH] IN BLOOD BY AUTOMATED COUNT: 12.2 % (ref 11.5–14.5)
ETHANOL SERPL-MCNC: 178 MG/DL (ref 0–10)
GLOBULIN SER-MCNC: 4.3 G/L
GLUCOSE SERPL-MCNC: 67 MG/DL (ref 74–106)
GLUCOSE SERPL-MCNC: NEGATIVE MG/DL
HCT VFR BLD CALC: 45.3 % (ref 42–54)
HGB BLD-MCNC: 15.6 G/DL (ref 13.5–17.5)
IMM GRANULOCYTES NFR BLD: 0.6 % (ref 0–5)
KETONES UR STRIP-MCNC: NEGATIVE MG/DL
LIPASE SERPL-CCNC: 376 U/L (ref 73–393)
LYMPHOCYTES NFR BLD AUTO: 33.5 % (ref 15–50)
MCH RBC QN AUTO: 31.3 PG (ref 26–34)
MCHC RBC AUTO-ENTMCNC: 34.4 G/DL (ref 31–37)
MCV RBC: 91 FL (ref 80–100)
MONOCYTES NFR BLD: 3.2 % (ref 2–11)
NEUTROPHILS NFR BLD AUTO: 60.2 % (ref 40–80)
NITRITE UR-MCNC: NEGATIVE MG/ML
OPIATES UR QL SCN: POSITIVE QUAL
OSMOLALITY SERPL CALC.SUM OF ELEC: 283 MOSM/KG (ref 275–300)
PCP UR QL SCN: NEGATIVE QUAL
PH UR STRIP: 5 [PH] (ref 5–6)
PLATELET # BLD: 200 10X3/UL (ref 130–400)
PMV BLD AUTO: 9.4 FL (ref 7.4–10.4)
POTASSIUM SERPL-SCNC: 3.7 MMOL/L (ref 3.5–5.1)
PROT SERPL-MCNC: 8.2 G/DL (ref 6.4–8.2)
PROT UR-MCNC: NEGATIVE MG/DL
RBC # BLD AUTO: 4.98 10X6/UL (ref 4.2–6.1)
SODIUM SERPL-SCNC: 143 MMOL/L (ref 136–145)
SP GR UR STRIP: 1.01 (ref 1–1.02)
UROBILINOGEN UR-MCNC: NORMAL MG/DL
WBC # BLD AUTO: 8.2 10X3/UL (ref 4.8–10.8)

## 2019-11-22 ENCOUNTER — HOSPITAL ENCOUNTER (INPATIENT)
Dept: HOSPITAL 84 - D.ER | Age: 44
LOS: 4 days | Discharge: HOME | DRG: 300 | End: 2019-11-26
Attending: FAMILY MEDICINE | Admitting: FAMILY MEDICINE
Payer: MEDICAID

## 2019-11-22 VITALS
HEIGHT: 72 IN | BODY MASS INDEX: 23.89 KG/M2 | BODY MASS INDEX: 23.89 KG/M2 | HEIGHT: 72 IN | WEIGHT: 176.37 LBS | WEIGHT: 176.37 LBS | BODY MASS INDEX: 23.89 KG/M2

## 2019-11-22 DIAGNOSIS — I10: ICD-10-CM

## 2019-11-22 DIAGNOSIS — Z86.718: ICD-10-CM

## 2019-11-22 DIAGNOSIS — Z79.01: ICD-10-CM

## 2019-11-22 DIAGNOSIS — D68.51: ICD-10-CM

## 2019-11-22 DIAGNOSIS — I82.412: Primary | ICD-10-CM

## 2019-11-22 DIAGNOSIS — D64.9: ICD-10-CM

## 2019-11-22 LAB
ALBUMIN SERPL-MCNC: 3.6 G/DL (ref 3.4–5)
ALP SERPL-CCNC: 84 U/L (ref 46–116)
ALT SERPL-CCNC: 22 U/L (ref 10–68)
ANION GAP SERPL CALC-SCNC: 9.3 MMOL/L (ref 8–16)
APTT BLD: 25.5 SECONDS (ref 22.8–39.4)
BASOPHILS NFR BLD AUTO: 0.2 % (ref 0–2)
BILIRUB SERPL-MCNC: 0.42 MG/DL (ref 0.2–1.3)
BUN SERPL-MCNC: 10 MG/DL (ref 7–18)
CALCIUM SERPL-MCNC: 8.7 MG/DL (ref 8.5–10.1)
CHLORIDE SERPL-SCNC: 103 MMOL/L (ref 98–107)
CO2 SERPL-SCNC: 30.4 MMOL/L (ref 21–32)
CREAT SERPL-MCNC: 0.8 MG/DL (ref 0.6–1.3)
D DIMER PPP FEU-MCNC: 3.36 UG/MLFEU (ref 0.2–0.54)
EOSINOPHIL NFR BLD: 0.9 % (ref 0–7)
ERYTHROCYTE [DISTWIDTH] IN BLOOD BY AUTOMATED COUNT: 13.2 % (ref 11.5–14.5)
GLOBULIN SER-MCNC: 3.6 G/L
GLUCOSE SERPL-MCNC: 102 MG/DL (ref 74–106)
HCT VFR BLD CALC: 33.2 % (ref 42–54)
HGB BLD-MCNC: 10.7 G/DL (ref 13.5–17.5)
IMM GRANULOCYTES NFR BLD: 0.2 % (ref 0–5)
INR PPP: 1.05 (ref 0.85–1.17)
LYMPHOCYTES NFR BLD AUTO: 25.7 % (ref 15–50)
MCH RBC QN AUTO: 30.1 PG (ref 26–34)
MCHC RBC AUTO-ENTMCNC: 32.2 G/DL (ref 31–37)
MCV RBC: 93.3 FL (ref 80–100)
MONOCYTES NFR BLD: 9.9 % (ref 2–11)
NEUTROPHILS NFR BLD AUTO: 63.1 % (ref 40–80)
OSMOLALITY SERPL CALC.SUM OF ELEC: 276 MOSM/KG (ref 275–300)
PLATELET # BLD: 218 10X3/UL (ref 130–400)
PMV BLD AUTO: 8.4 FL (ref 7.4–10.4)
POTASSIUM SERPL-SCNC: 3.7 MMOL/L (ref 3.5–5.1)
PROT SERPL-MCNC: 7.2 G/DL (ref 6.4–8.2)
PROTHROMBIN TIME: 13.2 SECONDS (ref 11.6–15)
RBC # BLD AUTO: 3.56 10X6/UL (ref 4.2–6.1)
SODIUM SERPL-SCNC: 139 MMOL/L (ref 136–145)
WBC # BLD AUTO: 5.5 10X3/UL (ref 4.8–10.8)

## 2019-11-22 NOTE — NUR
PT GIVEN WATER TO DRINK, DENIES ANY FURTHER NEEDS AT THIS TIME, CALL LIGHT
WITHIN REACH. WILL CONTINUE TO MONITOR.

## 2019-11-23 VITALS — SYSTOLIC BLOOD PRESSURE: 159 MMHG | DIASTOLIC BLOOD PRESSURE: 102 MMHG

## 2019-11-23 VITALS — DIASTOLIC BLOOD PRESSURE: 95 MMHG | SYSTOLIC BLOOD PRESSURE: 148 MMHG

## 2019-11-23 VITALS — DIASTOLIC BLOOD PRESSURE: 103 MMHG | SYSTOLIC BLOOD PRESSURE: 155 MMHG

## 2019-11-23 VITALS — SYSTOLIC BLOOD PRESSURE: 142 MMHG | DIASTOLIC BLOOD PRESSURE: 100 MMHG

## 2019-11-23 VITALS — DIASTOLIC BLOOD PRESSURE: 95 MMHG | SYSTOLIC BLOOD PRESSURE: 146 MMHG

## 2019-11-23 VITALS — SYSTOLIC BLOOD PRESSURE: 152 MMHG | DIASTOLIC BLOOD PRESSURE: 100 MMHG

## 2019-11-23 LAB
ALBUMIN SERPL-MCNC: 3.3 G/DL (ref 3.4–5)
ALP SERPL-CCNC: 87 U/L (ref 46–116)
ALT SERPL-CCNC: 23 U/L (ref 10–68)
ANION GAP SERPL CALC-SCNC: 10.7 MMOL/L (ref 8–16)
BASOPHILS NFR BLD AUTO: 0.2 % (ref 0–2)
BILIRUB SERPL-MCNC: 0.44 MG/DL (ref 0.2–1.3)
BUN SERPL-MCNC: 9 MG/DL (ref 7–18)
CALCIUM SERPL-MCNC: 8.8 MG/DL (ref 8.5–10.1)
CHLORIDE SERPL-SCNC: 103 MMOL/L (ref 98–107)
CO2 SERPL-SCNC: 28.8 MMOL/L (ref 21–32)
CREAT SERPL-MCNC: 0.8 MG/DL (ref 0.6–1.3)
EOSINOPHIL NFR BLD: 1.4 % (ref 0–7)
ERYTHROCYTE [DISTWIDTH] IN BLOOD BY AUTOMATED COUNT: 13.2 % (ref 11.5–14.5)
GLOBULIN SER-MCNC: 3.6 G/L
GLUCOSE SERPL-MCNC: 141 MG/DL (ref 74–106)
HCT VFR BLD CALC: 33.2 % (ref 42–54)
HGB BLD-MCNC: 10.8 G/DL (ref 13.5–17.5)
IMM GRANULOCYTES NFR BLD: 0.2 % (ref 0–5)
LYMPHOCYTES NFR BLD AUTO: 30.8 % (ref 15–50)
MCH RBC QN AUTO: 30.1 PG (ref 26–34)
MCHC RBC AUTO-ENTMCNC: 32.5 G/DL (ref 31–37)
MCV RBC: 92.5 FL (ref 80–100)
MONOCYTES NFR BLD: 10.6 % (ref 2–11)
NEUTROPHILS NFR BLD AUTO: 56.8 % (ref 40–80)
OSMOLALITY SERPL CALC.SUM OF ELEC: 278 MOSM/KG (ref 275–300)
PLATELET # BLD: 208 10X3/UL (ref 130–400)
PMV BLD AUTO: 8.7 FL (ref 7.4–10.4)
POTASSIUM SERPL-SCNC: 3.5 MMOL/L (ref 3.5–5.1)
PROT SERPL-MCNC: 6.9 G/DL (ref 6.4–8.2)
RBC # BLD AUTO: 3.59 10X6/UL (ref 4.2–6.1)
SODIUM SERPL-SCNC: 139 MMOL/L (ref 136–145)
WBC # BLD AUTO: 4.4 10X3/UL (ref 4.8–10.8)

## 2019-11-23 NOTE — NUR
PT ALERT X 4. BREATH SOUNDS DIMINISHED TO RLL. TELEMETRY IN PLACE. IV TO RIGHT
AC, SALINE LOCKED. +2 EDEMA TO LEFT LEG, PULSE PALPABLE. PT REPORTING PAIN OF
7/10, MEDICATED PER ORDERS, WILL MONITOR. PT JUST FINISHED UP WITH SHOWER. BED
LOW, CALL LIGHT IN REACH. NO OTHER NEEDS AT THIS TIME.

## 2019-11-23 NOTE — NUR
ASSESSED WHEN PATIENT ARRIVED FROM THE ER. HE IS ALERT AND ORIENTED, ABLE TO
VERBALIZE NEEDS AND GET UP AD KAM TO THE BATHROOM. HE WAS GIVEN A SANDWICH
TRAY AND DRINKS AFTER ADMIT PAPERWORK WAS DONE. HIS LEFT LOWER LEG IS RED AND
SWOLLEN. THE FOOT OF THE BED HAS BEEN ELEVATED TO KEEP IT UP. NO MEDS HAVE
BEEN DUE AT THIS TIME AND HE IS RESTING WELL.

## 2019-11-24 VITALS — DIASTOLIC BLOOD PRESSURE: 88 MMHG | SYSTOLIC BLOOD PRESSURE: 130 MMHG

## 2019-11-24 VITALS — DIASTOLIC BLOOD PRESSURE: 88 MMHG | SYSTOLIC BLOOD PRESSURE: 129 MMHG

## 2019-11-24 VITALS — SYSTOLIC BLOOD PRESSURE: 149 MMHG | DIASTOLIC BLOOD PRESSURE: 95 MMHG

## 2019-11-24 VITALS — SYSTOLIC BLOOD PRESSURE: 122 MMHG | DIASTOLIC BLOOD PRESSURE: 91 MMHG

## 2019-11-24 VITALS — SYSTOLIC BLOOD PRESSURE: 120 MMHG | DIASTOLIC BLOOD PRESSURE: 82 MMHG

## 2019-11-24 VITALS — SYSTOLIC BLOOD PRESSURE: 133 MMHG | DIASTOLIC BLOOD PRESSURE: 95 MMHG

## 2019-11-24 LAB
ANION GAP SERPL CALC-SCNC: 13.4 MMOL/L (ref 8–16)
BASOPHILS NFR BLD AUTO: 0 % (ref 0–2)
BUN SERPL-MCNC: 5 MG/DL (ref 7–18)
CALCIUM SERPL-MCNC: 9 MG/DL (ref 8.5–10.1)
CHLORIDE SERPL-SCNC: 104 MMOL/L (ref 98–107)
CO2 SERPL-SCNC: 25.5 MMOL/L (ref 21–32)
CREAT SERPL-MCNC: 0.7 MG/DL (ref 0.6–1.3)
EOSINOPHIL NFR BLD: 1.5 % (ref 0–7)
ERYTHROCYTE [DISTWIDTH] IN BLOOD BY AUTOMATED COUNT: 13.5 % (ref 11.5–14.5)
GLUCOSE SERPL-MCNC: 140 MG/DL (ref 74–106)
HCT VFR BLD CALC: 35.8 % (ref 42–54)
HGB BLD-MCNC: 11.5 G/DL (ref 13.5–17.5)
IMM GRANULOCYTES NFR BLD: 0.3 % (ref 0–5)
INR PPP: 1.09 (ref 0.85–1.17)
LYMPHOCYTES NFR BLD AUTO: 29.3 % (ref 15–50)
MAGNESIUM SERPL-MCNC: 2 MG/DL (ref 1.8–2.4)
MCH RBC QN AUTO: 29.9 PG (ref 26–34)
MCHC RBC AUTO-ENTMCNC: 32.1 G/DL (ref 31–37)
MCV RBC: 93.2 FL (ref 80–100)
MONOCYTES NFR BLD: 11.4 % (ref 2–11)
NEUTROPHILS NFR BLD AUTO: 57.5 % (ref 40–80)
OSMOLALITY SERPL CALC.SUM OF ELEC: 276 MOSM/KG (ref 275–300)
PLATELET # BLD: 195 10X3/UL (ref 130–400)
PMV BLD AUTO: 8.7 FL (ref 7.4–10.4)
POTASSIUM SERPL-SCNC: 3.9 MMOL/L (ref 3.5–5.1)
PROTHROMBIN TIME: 13.6 SECONDS (ref 11.6–15)
RBC # BLD AUTO: 3.84 10X6/UL (ref 4.2–6.1)
SODIUM SERPL-SCNC: 139 MMOL/L (ref 136–145)
WBC # BLD AUTO: 3.4 10X3/UL (ref 4.8–10.8)

## 2019-11-24 NOTE — NUR
PATIENT RESTING IN BED WITH NO S/S OF DISTRESS AND DENIES NEEDS AT THIS TIME.
BED IN LOWEST POSITION AND CALL LIGHT WITHIN REACH. ENCOURAGED THE PATIENT TO
CALL IF HE HAS NEEDS. WILL CONTINUE TO MONITOR.

## 2019-11-24 NOTE — NUR
PT ALERT X 4. BREATH SOUNDS DIMINISHED TO RLL. TELEMETRY IN PLACE. IV TO RIGHT
AC, SALINE LOCKED. PT REPORTING PAIN OF 7/10, MEDICATED PER ORDERS, WILL
MONITOR. SWELLING TO LLE REDUCED, EDEMA +1, PULSE PALPABLE. FAMILY IN ROOM.
BED LOW, CALL LIGHT IN REACH. NO OTHER NEEDS AT THIS TIME.

## 2019-11-25 VITALS — DIASTOLIC BLOOD PRESSURE: 92 MMHG | SYSTOLIC BLOOD PRESSURE: 140 MMHG

## 2019-11-25 VITALS — DIASTOLIC BLOOD PRESSURE: 89 MMHG | SYSTOLIC BLOOD PRESSURE: 133 MMHG

## 2019-11-25 VITALS — DIASTOLIC BLOOD PRESSURE: 99 MMHG | SYSTOLIC BLOOD PRESSURE: 140 MMHG

## 2019-11-25 VITALS — SYSTOLIC BLOOD PRESSURE: 128 MMHG | DIASTOLIC BLOOD PRESSURE: 73 MMHG

## 2019-11-25 LAB
ANION GAP SERPL CALC-SCNC: 11.5 MMOL/L (ref 8–16)
BASOPHILS NFR BLD AUTO: 0.3 % (ref 0–2)
BUN SERPL-MCNC: 9 MG/DL (ref 7–18)
CALCIUM SERPL-MCNC: 8.9 MG/DL (ref 8.5–10.1)
CHLORIDE SERPL-SCNC: 106 MMOL/L (ref 98–107)
CO2 SERPL-SCNC: 26.1 MMOL/L (ref 21–32)
CREAT SERPL-MCNC: 0.7 MG/DL (ref 0.6–1.3)
EOSINOPHIL NFR BLD: 2.3 % (ref 0–7)
ERYTHROCYTE [DISTWIDTH] IN BLOOD BY AUTOMATED COUNT: 13.3 % (ref 11.5–14.5)
GLUCOSE SERPL-MCNC: 74 MG/DL (ref 74–106)
HCT VFR BLD CALC: 35.6 % (ref 42–54)
HGB BLD-MCNC: 11.4 G/DL (ref 13.5–17.5)
IMM GRANULOCYTES NFR BLD: 0 % (ref 0–5)
INR PPP: 1.04 (ref 0.85–1.17)
LYMPHOCYTES NFR BLD AUTO: 36.6 % (ref 15–50)
MAGNESIUM SERPL-MCNC: 2.2 MG/DL (ref 1.8–2.4)
MCH RBC QN AUTO: 29.7 PG (ref 26–34)
MCHC RBC AUTO-ENTMCNC: 32 G/DL (ref 31–37)
MCV RBC: 92.7 FL (ref 80–100)
MONOCYTES NFR BLD: 11.9 % (ref 2–11)
NEUTROPHILS NFR BLD AUTO: 48.9 % (ref 40–80)
OSMOLALITY SERPL CALC.SUM OF ELEC: 276 MOSM/KG (ref 275–300)
PLATELET # BLD: 230 10X3/UL (ref 130–400)
PMV BLD AUTO: 8.8 FL (ref 7.4–10.4)
POTASSIUM SERPL-SCNC: 3.6 MMOL/L (ref 3.5–5.1)
PROTHROMBIN TIME: 13.1 SECONDS (ref 11.6–15)
RBC # BLD AUTO: 3.84 10X6/UL (ref 4.2–6.1)
SODIUM SERPL-SCNC: 140 MMOL/L (ref 136–145)
WBC # BLD AUTO: 3.9 10X3/UL (ref 4.8–10.8)

## 2019-11-26 VITALS — SYSTOLIC BLOOD PRESSURE: 124 MMHG | DIASTOLIC BLOOD PRESSURE: 80 MMHG

## 2019-11-26 VITALS — DIASTOLIC BLOOD PRESSURE: 86 MMHG | SYSTOLIC BLOOD PRESSURE: 142 MMHG

## 2019-11-26 VITALS — SYSTOLIC BLOOD PRESSURE: 113 MMHG | DIASTOLIC BLOOD PRESSURE: 78 MMHG

## 2019-11-26 LAB
ANION GAP SERPL CALC-SCNC: 11.9 MMOL/L (ref 8–16)
BASOPHILS NFR BLD AUTO: 0.2 % (ref 0–2)
BUN SERPL-MCNC: 14 MG/DL (ref 7–18)
CALCIUM SERPL-MCNC: 9.2 MG/DL (ref 8.5–10.1)
CHLORIDE SERPL-SCNC: 104 MMOL/L (ref 98–107)
CO2 SERPL-SCNC: 27.1 MMOL/L (ref 21–32)
CREAT SERPL-MCNC: 0.9 MG/DL (ref 0.6–1.3)
EOSINOPHIL NFR BLD: 1.9 % (ref 0–7)
ERYTHROCYTE [DISTWIDTH] IN BLOOD BY AUTOMATED COUNT: 13.2 % (ref 11.5–14.5)
GLUCOSE SERPL-MCNC: 95 MG/DL (ref 74–106)
HCT VFR BLD CALC: 37.5 % (ref 42–54)
HGB BLD-MCNC: 12.2 G/DL (ref 13.5–17.5)
IMM GRANULOCYTES NFR BLD: 0 % (ref 0–5)
INR PPP: 1.13 (ref 0.85–1.17)
LYMPHOCYTES NFR BLD AUTO: 41.8 % (ref 15–50)
MAGNESIUM SERPL-MCNC: 2.3 MG/DL (ref 1.8–2.4)
MCH RBC QN AUTO: 29.8 PG (ref 26–34)
MCHC RBC AUTO-ENTMCNC: 32.5 G/DL (ref 31–37)
MCV RBC: 91.5 FL (ref 80–100)
MONOCYTES NFR BLD: 11.9 % (ref 2–11)
NEUTROPHILS NFR BLD AUTO: 44.2 % (ref 40–80)
OSMOLALITY SERPL CALC.SUM OF ELEC: 278 MOSM/KG (ref 275–300)
PLATELET # BLD: 224 10X3/UL (ref 130–400)
PMV BLD AUTO: 8.9 FL (ref 7.4–10.4)
POTASSIUM SERPL-SCNC: 4 MMOL/L (ref 3.5–5.1)
PROTHROMBIN TIME: 14 SECONDS (ref 11.6–15)
RBC # BLD AUTO: 4.1 10X6/UL (ref 4.2–6.1)
SCREEN APTT: 36.3 SEC (ref 0–51.9)
SCREEN DRVVT: 28.4 SEC (ref 0–47)
SODIUM SERPL-SCNC: 139 MMOL/L (ref 136–145)
THROMBIN TIME: 18.5 SEC (ref 0–23)
WBC # BLD AUTO: 4.2 10X3/UL (ref 4.8–10.8)

## 2019-11-26 NOTE — NUR
PATIENT IS DISCHARGING TODAY. DC INSTRUCTIONS GIVEN/VERBALIZES UNDERSTANDING.
INSTRUCTED TO CALL FOR APPT WITH DR MICHELE IN A WEEK. WAITING ON RIDE AND ALSO
WAITING FOR PAVAN RHODES TO GIVE HIM SCRIPT FOR FREE MEDICATION  OF ELIQUIS X 1
MTH. WILL DC AFTER HE GETS SCRIPT.

## 2019-11-26 NOTE — NUR
OT NOTE: PT PERFORMING ALL ADLS AND  MOBILITY INDEP. REPORTS THAT HE HAS
CONTINUED PAIN, BUT NOT CURRENTLY AFFECTING HIS ADLS AND MOBILITY. PT TO BE
DCD [facility].
ESTELLE MARINELLI, OTR/L

## 2019-12-10 ENCOUNTER — HOSPITAL ENCOUNTER (INPATIENT)
Dept: HOSPITAL 84 - D.ER | Age: 44
LOS: 4 days | Discharge: HOME | DRG: 300 | End: 2019-12-14
Attending: FAMILY MEDICINE | Admitting: FAMILY MEDICINE
Payer: MEDICAID

## 2019-12-10 VITALS — SYSTOLIC BLOOD PRESSURE: 137 MMHG | DIASTOLIC BLOOD PRESSURE: 96 MMHG

## 2019-12-10 VITALS — SYSTOLIC BLOOD PRESSURE: 98 MMHG | DIASTOLIC BLOOD PRESSURE: 70 MMHG

## 2019-12-10 VITALS
BODY MASS INDEX: 24.43 KG/M2 | WEIGHT: 180.38 LBS | BODY MASS INDEX: 24.43 KG/M2 | WEIGHT: 180.38 LBS | HEIGHT: 72 IN | HEIGHT: 72 IN

## 2019-12-10 VITALS — SYSTOLIC BLOOD PRESSURE: 104 MMHG | DIASTOLIC BLOOD PRESSURE: 68 MMHG

## 2019-12-10 DIAGNOSIS — D68.2: ICD-10-CM

## 2019-12-10 DIAGNOSIS — Z72.0: ICD-10-CM

## 2019-12-10 DIAGNOSIS — I10: ICD-10-CM

## 2019-12-10 DIAGNOSIS — I82.402: Primary | ICD-10-CM

## 2019-12-10 DIAGNOSIS — L03.116: ICD-10-CM

## 2019-12-10 LAB
ALBUMIN SERPL-MCNC: 3.9 G/DL (ref 3.4–5)
ALP SERPL-CCNC: 102 U/L (ref 46–116)
ALT SERPL-CCNC: 23 U/L (ref 10–68)
ANION GAP SERPL CALC-SCNC: 9.7 MMOL/L (ref 8–16)
APTT BLD: 26.4 SECONDS (ref 22.8–39.4)
BASOPHILS NFR BLD AUTO: 0.2 % (ref 0–2)
BILIRUB SERPL-MCNC: 0.71 MG/DL (ref 0.2–1.3)
BUN SERPL-MCNC: 19 MG/DL (ref 7–18)
CALCIUM SERPL-MCNC: 9.7 MG/DL (ref 8.5–10.1)
CHLORIDE SERPL-SCNC: 100 MMOL/L (ref 98–107)
CO2 SERPL-SCNC: 31.3 MMOL/L (ref 21–32)
CREAT SERPL-MCNC: 0.9 MG/DL (ref 0.6–1.3)
D DIMER PPP FEU-MCNC: 2.87 UG/MLFEU (ref 0.2–0.54)
EOSINOPHIL NFR BLD: 0.6 % (ref 0–7)
ERYTHROCYTE [DISTWIDTH] IN BLOOD BY AUTOMATED COUNT: 12.9 % (ref 11.5–14.5)
GLOBULIN SER-MCNC: 4.9 G/L
GLUCOSE SERPL-MCNC: 86 MG/DL (ref 74–106)
HCT VFR BLD CALC: 38.8 % (ref 42–54)
HGB BLD-MCNC: 13 G/DL (ref 13.5–17.5)
IMM GRANULOCYTES NFR BLD: 0.2 % (ref 0–5)
INR PPP: 1.09 (ref 0.85–1.17)
LYMPHOCYTES NFR BLD AUTO: 17.5 % (ref 15–50)
MCH RBC QN AUTO: 30.8 PG (ref 26–34)
MCHC RBC AUTO-ENTMCNC: 33.5 G/DL (ref 31–37)
MCV RBC: 91.9 FL (ref 80–100)
MONOCYTES NFR BLD: 11 % (ref 2–11)
NEUTROPHILS NFR BLD AUTO: 70.5 % (ref 40–80)
OSMOLALITY SERPL CALC.SUM OF ELEC: 274 MOSM/KG (ref 275–300)
PLATELET # BLD: 268 10X3/UL (ref 130–400)
PMV BLD AUTO: 10.4 FL (ref 7.4–10.4)
POTASSIUM SERPL-SCNC: 4 MMOL/L (ref 3.5–5.1)
PROT SERPL-MCNC: 8.8 G/DL (ref 6.4–8.2)
PROTHROMBIN TIME: 13.6 SECONDS (ref 11.6–15)
RBC # BLD AUTO: 4.22 10X6/UL (ref 4.2–6.1)
SODIUM SERPL-SCNC: 137 MMOL/L (ref 136–145)
WBC # BLD AUTO: 8.1 10X3/UL (ref 4.8–10.8)

## 2019-12-10 NOTE — NUR
RECEIVED PT FROM ER. PT IS AAO AND UP AD KAM. RR EVEN AND UNLABORED ON RA. VSS
AND WNL. QUICKSTART, MED REQ, AND HISTORY COMPLETE. PT REPORTS OF PAIN IN LEFT
LEG LEVEL 6/10 AT THIS TIME. INSTRUCTED PT ON MEDICATON TIMES. PT DENIES ANY
NEEDS AT THIS TIME. CALL LIGHT W/I REACH. NO S/S OF DISTRESS NOTED. WILL CTM.

## 2019-12-10 NOTE — NUR
PATIENT SITTING UP IN BED WATCHING TV.CL IN REACH. DENIES NEEDS AT THIS TIME.
BED IN LOW SIDE RAILS X2. A/O X4. LUNGS CLEAR. BOWEL ACTIVE X4. RESP EVEN AND
UNLABORED. WILL CONTINUE TO MONITOR.

## 2019-12-11 VITALS — DIASTOLIC BLOOD PRESSURE: 68 MMHG | SYSTOLIC BLOOD PRESSURE: 94 MMHG

## 2019-12-11 VITALS — SYSTOLIC BLOOD PRESSURE: 110 MMHG | DIASTOLIC BLOOD PRESSURE: 76 MMHG

## 2019-12-11 VITALS — SYSTOLIC BLOOD PRESSURE: 104 MMHG | DIASTOLIC BLOOD PRESSURE: 74 MMHG

## 2019-12-11 VITALS — DIASTOLIC BLOOD PRESSURE: 60 MMHG | SYSTOLIC BLOOD PRESSURE: 95 MMHG

## 2019-12-11 VITALS — SYSTOLIC BLOOD PRESSURE: 107 MMHG | DIASTOLIC BLOOD PRESSURE: 73 MMHG

## 2019-12-11 LAB
ALBUMIN SERPL-MCNC: 3.1 G/DL (ref 3.4–5)
ALP SERPL-CCNC: 82 U/L (ref 46–116)
ALT SERPL-CCNC: 19 U/L (ref 10–68)
ANION GAP SERPL CALC-SCNC: 10.7 MMOL/L (ref 8–16)
BASOPHILS NFR BLD AUTO: 0.2 % (ref 0–2)
BILIRUB SERPL-MCNC: 0.33 MG/DL (ref 0.2–1.3)
BUN SERPL-MCNC: 16 MG/DL (ref 7–18)
CALCIUM SERPL-MCNC: 9 MG/DL (ref 8.5–10.1)
CHLORIDE SERPL-SCNC: 104 MMOL/L (ref 98–107)
CO2 SERPL-SCNC: 29 MMOL/L (ref 21–32)
CREAT SERPL-MCNC: 0.8 MG/DL (ref 0.6–1.3)
EOSINOPHIL NFR BLD: 1.6 % (ref 0–7)
ERYTHROCYTE [DISTWIDTH] IN BLOOD BY AUTOMATED COUNT: 12.6 % (ref 11.5–14.5)
GLOBULIN SER-MCNC: 3.6 G/L
GLUCOSE SERPL-MCNC: 105 MG/DL (ref 74–106)
HCT VFR BLD CALC: 37.4 % (ref 42–54)
HGB BLD-MCNC: 12.2 G/DL (ref 13.5–17.5)
IMM GRANULOCYTES NFR BLD: 0.2 % (ref 0–5)
INR PPP: 1.11 (ref 0.85–1.17)
LYMPHOCYTES NFR BLD AUTO: 29.9 % (ref 15–50)
MCH RBC QN AUTO: 30.2 PG (ref 26–34)
MCHC RBC AUTO-ENTMCNC: 32.6 G/DL (ref 31–37)
MCV RBC: 92.6 FL (ref 80–100)
MONOCYTES NFR BLD: 15.7 % (ref 2–11)
NEUTROPHILS NFR BLD AUTO: 52.4 % (ref 40–80)
OSMOLALITY SERPL CALC.SUM OF ELEC: 279 MOSM/KG (ref 275–300)
PLATELET # BLD: 187 10X3/UL (ref 130–400)
PMV BLD AUTO: 9.4 FL (ref 7.4–10.4)
POTASSIUM SERPL-SCNC: 3.7 MMOL/L (ref 3.5–5.1)
PROT SERPL-MCNC: 6.7 G/DL (ref 6.4–8.2)
PROTHROMBIN TIME: 14.3 SECONDS (ref 11.6–15)
RBC # BLD AUTO: 4.04 10X6/UL (ref 4.2–6.1)
SODIUM SERPL-SCNC: 140 MMOL/L (ref 136–145)
WBC # BLD AUTO: 5.1 10X3/UL (ref 4.8–10.8)

## 2019-12-11 NOTE — MORECARE
CASE MANAGEMENT DISCHARGE SUMMARY
 
 
PATIENT: CELESTE FIERRO                      UNIT: [id number]
ACCOUNT#: [id number]                       ADM DATE: 12/10/19
AGE: 44     : 79  SEX: M            ROOM/BED: D.1209    
AUTHOR: APRIL VALDEZ                             PHYSICIAN:                               
 
REFERRING PHYSICIAN: MARTHA SEQUEIRA MD           
DATE OF SERVICE: 19
Discharge Plan
 
 
Patient Name: CELESTE FIERRO
[facility]
Encounter #: [id number]
Medical Record #: [id number]
: 1975
Planned Disposition: Home
Anticipated Discharge Date: 
 
Discharge Date: 
Expected LOS: 
Initial Reviewer: [email or web address]
Initial Review Date: 2019
Generated: 19   5:29 pm 
  
 
 
 
 
 
 
 
Patient Name: CELESTE FIERRO
 
Encounter #: [id number]
Page [id number]
 
 
 
 
 
Electronically Signed by APRIL VALDEZ on 19 at 1630
 
 
 
 
 
 
**All edits/amendments must be made on the electronic document**
 
DICTATION DATE: 19     : ELLE  19     
RPT#: [id number]                                DC DATE:        
                                               STATUS: ADM IN  
[facility]
 [place] [address]
***END OF REPORT***

## 2019-12-11 NOTE — NUR
PATIENT RESTING IN BED WITH GUEST AT BEDSIDE AND NO S/S OF DISTRESS. BED IN
LOWEST POSITION AND CALL LIGHT WITHIN REACH. ENCOURAGED THE PATIENT TO CALL IF
HE HAS NEEDS. WILL CONTINUE TO MONITOR.

## 2019-12-11 NOTE — NUR
PATIENT RESTING IN BED WITH NO S/S OF DISTRESS. ADMINISTERED MEDS PER ORDERS.
PATIENT DENIES OTHER NEEDS. WILL CONTINUE TO MONITOR.

## 2019-12-11 NOTE — MORECARE
CASE MANAGEMENT DISCHARGE SUMMARY
 
 
PATIENT: CELESTE FIERRO                      UNIT: [id number]
ACCOUNT#: [id number]                       ADM DATE: 12/10/19
AGE: 44     : 79  SEX: M            ROOM/BED: D.1209    
AUTHOR: APRIL VALDEZ                             PHYSICIAN:                               
 
REFERRING PHYSICIAN: MARTHA SEQUEIRA MD           
DATE OF SERVICE: 19
Discharge Plan
 
 
Patient Name: CELESTE FIERRO
Facility: [facility]:[place]
Encounter #: [id number]
Medical Record #: [id number]
: 1975
Planned Disposition: Home
Anticipated Discharge Date: 
 
Discharge Date: 
Expected LOS: 
Initial Reviewer: [email or web address]
Initial Review Date: 2019
Generated: 19   5:40 pm 
Comments
 
DCP- Discharge Planning
 
Updated by [email or web address]: Yasmin Dickson on 19   3:30 pm CT
Patient Name: CELESTE FIERRO                                     
Admission Status: ER   
Accout number: [id number]                              
Admission Date: 12-   
: 1975                                                        
Admission Diagnosis:   
Attending: MARTHA SCOTT                                                
Current LOS:  1   
  
Anticipated DC Date:    
Planned Disposition: Home   
Primary Insurance: [place]   
  
  
Discharge Planning Comments: CM MET WITH PATIENT AFTER OBTAINING VERBAL 
CONSENT. STATES PLANS TO DISCHARGE TO HOME. DISCUSSED NEED FOR HH, REHAB OR 
EQUIPMENT, PATIENT STATES HE WAS JUST HERE SEVERAL WEEKS AGO AND WE GAVE HIM 
A CARD FOR FREE ELIQUIS BUT SAID THE CARD WAS NOT GOOD. HE IS BACK WITH SAME 
COMPLAINT OF DVT LEFT LEG. WE WILL NEED TO REASSESS HIS DC MED NEEDS AT TIME 
 
OF DISCHARGE. HE STATES HAS ALEN BUT I'M SHOWING ALEN PENDING. CM WILL FOLLOW 
AND ASSIST AS NEEDED.   
  
  
  
  
  
  
: Yasmin Dickson
 DCPIA - Discharge Planning Initial Assessment
 
Updated by [email or web address]: Yasmin Dickson on 19   4:31 pm
*  Is the patient Alert and Oriented?
Yes
*  PCP
[facility]
*  Pharmacy
[facility]
*  ADLs
Independent
*  Additional services required to return to the preadmission environment?
No
*  Can the patient safely return to the preadmission environment?
Yes
*  Has this patient been hospitalized within the prior 30 days at any 
hospital?
Yes
 
 
 
 
 
 
 
Last DP export: 19   3:30 
Patient Name: CELESTE FIERRO
 
Encounter #: [id number]
Page [id number]
 
 
 
 
 
Electronically Signed by APRIL VALDEZ on 19 at 1641
 
 
 
 
 
 
**All edits/amendments must be made on the electronic document**
 
DICTATION DATE: 19 1640     : ELLE  19 1640     
RPT#: [id number]                                DC DATE:        
                                               STATUS: ADM IN  
[facility]
 [place] [address]
***END OF REPORT***

## 2019-12-12 VITALS — SYSTOLIC BLOOD PRESSURE: 107 MMHG | DIASTOLIC BLOOD PRESSURE: 78 MMHG

## 2019-12-12 VITALS — DIASTOLIC BLOOD PRESSURE: 77 MMHG | SYSTOLIC BLOOD PRESSURE: 127 MMHG

## 2019-12-12 VITALS — DIASTOLIC BLOOD PRESSURE: 70 MMHG | SYSTOLIC BLOOD PRESSURE: 124 MMHG

## 2019-12-12 VITALS — SYSTOLIC BLOOD PRESSURE: 109 MMHG | DIASTOLIC BLOOD PRESSURE: 74 MMHG

## 2019-12-12 VITALS — DIASTOLIC BLOOD PRESSURE: 72 MMHG | SYSTOLIC BLOOD PRESSURE: 106 MMHG

## 2019-12-12 LAB
ALBUMIN SERPL-MCNC: 2.9 G/DL (ref 3.4–5)
ALP SERPL-CCNC: 81 U/L (ref 46–116)
ALT SERPL-CCNC: 18 U/L (ref 10–68)
ANION GAP SERPL CALC-SCNC: 10.6 MMOL/L (ref 8–16)
BASOPHILS NFR BLD AUTO: 0.6 % (ref 0–2)
BILIRUB SERPL-MCNC: 0.24 MG/DL (ref 0.2–1.3)
BUN SERPL-MCNC: 17 MG/DL (ref 7–18)
CALCIUM SERPL-MCNC: 8.9 MG/DL (ref 8.5–10.1)
CHLORIDE SERPL-SCNC: 105 MMOL/L (ref 98–107)
CO2 SERPL-SCNC: 28.7 MMOL/L (ref 21–32)
CREAT SERPL-MCNC: 0.8 MG/DL (ref 0.6–1.3)
EOSINOPHIL NFR BLD: 2.2 % (ref 0–7)
ERYTHROCYTE [DISTWIDTH] IN BLOOD BY AUTOMATED COUNT: 12.5 % (ref 11.5–14.5)
GLOBULIN SER-MCNC: 4 G/L
GLUCOSE SERPL-MCNC: 80 MG/DL (ref 74–106)
HCT VFR BLD CALC: 35 % (ref 42–54)
HGB BLD-MCNC: 11.5 G/DL (ref 13.5–17.5)
IMM GRANULOCYTES NFR BLD: 0.2 % (ref 0–5)
INR PPP: 1.42 (ref 0.85–1.17)
LYMPHOCYTES NFR BLD AUTO: 42.6 % (ref 15–50)
MCH RBC QN AUTO: 29.9 PG (ref 26–34)
MCHC RBC AUTO-ENTMCNC: 32.9 G/DL (ref 31–37)
MCV RBC: 90.9 FL (ref 80–100)
MONOCYTES NFR BLD: 12.4 % (ref 2–11)
NEUTROPHILS NFR BLD AUTO: 42 % (ref 40–80)
OSMOLALITY SERPL CALC.SUM OF ELEC: 279 MOSM/KG (ref 275–300)
PLATELET # BLD: 206 10X3/UL (ref 130–400)
PMV BLD AUTO: 9.2 FL (ref 7.4–10.4)
POTASSIUM SERPL-SCNC: 4.3 MMOL/L (ref 3.5–5.1)
PROT SERPL-MCNC: 6.9 G/DL (ref 6.4–8.2)
PROTHROMBIN TIME: 16.8 SECONDS (ref 11.6–15)
RBC # BLD AUTO: 3.85 10X6/UL (ref 4.2–6.1)
SODIUM SERPL-SCNC: 140 MMOL/L (ref 136–145)
WBC # BLD AUTO: 4.9 10X3/UL (ref 4.8–10.8)

## 2019-12-12 NOTE — NUR
Subjective Finding:    Chief compalint: Patient presents with:  Back Pain  Neck Pain  , Pain Scale: 3/10, Intensity: sharp, Duration: 3 days, Radiating: no.    Date of injury:     Activities that the pain restricts:   Home/household/hobbies/social activities: yes.  Work duties: yes.  Sleep: yes.  Makes symptoms better: rest and core strength  Makes symptoms worse: activity.  Have you seen anyone else for the symptoms? .  Work related: no.  Automobile related injury: no.    Objective and Assessment:    Posture Analysis:   High shoulder: .  Head tilt: .  High iliac crest: .  Head carriage: neutral.  Thoracic Kyphosis: neutral.  Lumbar Lordosis: forward.    Lumbar Range of Motion: flexion decreased, extension decreased, left lateral flexion decreased and right lateral flexion decreased.  Cervical Range of Motion: extension decreased.  Thoracic Range of Motion: left lateral flexion decreased.  Extremity Range of Motion: .    Palpation:   Quad lumb: bilateral, referred pain: no  T paraspinals: dull pain and sharp pain, no    Segmental dysfunction pre-treatment and treatment area: C5, T6, T7, T11, L4, L5, PSIS Left and PSIS Right.    Assessment post-treatment:  Cervical: ROM increased.  Thoracic: ROM increased.  Lumbar: ROM inc.    Comments: Been through core ex classes and helps.  Drop piece adj in lumbar spine.      Complicating Factors: pain present for longer than 7 days.    Plan / Procedure:    Treatment plan: PRN.  Instructed patient: ice 20 minutes every other hour as needed, stretch as instructed at visit and walk 10 minutes.  Short term goals: reduce pain and increase ROM.  Long term goals: increase strength.  Prognosis: very good.              PT SITTING UP IN BED WATCHING TV. STATES HIS LEG CONTINUES TO HURT. VSS. WILL
CONTINUE TO MONITOR.

## 2019-12-12 NOTE — NUR
MORPHINE GIVEN WITH AM MEDS FOR PAIN IN LEFT LEG. NO OTHER COMPLAINTS OR NEEDS
AT THIS TIME. WILL CONTINUE TO MONITOR.

## 2019-12-12 NOTE — NUR
REPORT RECEIVED. PT ALERT AND ORIENTED. SITTING UP IN BED. IV TO RIGHT AC
SALINE LOCKED. PT HAS DVT TO LEFT UPPER LEG. REDDENED AND FIRM. ON LOVENOX.
VSS. COMPLAINS OF PAIN. VSS. NO OTHER NEEDS AT THIS TIME. WILL CONTINUE TO
MONITOR.

## 2019-12-13 VITALS — DIASTOLIC BLOOD PRESSURE: 58 MMHG | SYSTOLIC BLOOD PRESSURE: 98 MMHG

## 2019-12-13 VITALS — SYSTOLIC BLOOD PRESSURE: 118 MMHG | DIASTOLIC BLOOD PRESSURE: 80 MMHG

## 2019-12-13 VITALS — DIASTOLIC BLOOD PRESSURE: 68 MMHG | SYSTOLIC BLOOD PRESSURE: 124 MMHG

## 2019-12-13 VITALS — SYSTOLIC BLOOD PRESSURE: 110 MMHG | DIASTOLIC BLOOD PRESSURE: 76 MMHG

## 2019-12-13 VITALS — SYSTOLIC BLOOD PRESSURE: 90 MMHG | DIASTOLIC BLOOD PRESSURE: 52 MMHG

## 2019-12-13 LAB
ALBUMIN SERPL-MCNC: 3 G/DL (ref 3.4–5)
ALP SERPL-CCNC: 83 U/L (ref 46–116)
ALT SERPL-CCNC: 22 U/L (ref 10–68)
ANION GAP SERPL CALC-SCNC: 10.1 MMOL/L (ref 8–16)
BASOPHILS NFR BLD AUTO: 0.3 % (ref 0–2)
BILIRUB SERPL-MCNC: 0.19 MG/DL (ref 0.2–1.3)
BUN SERPL-MCNC: 19 MG/DL (ref 7–18)
CALCIUM SERPL-MCNC: 9 MG/DL (ref 8.5–10.1)
CHLORIDE SERPL-SCNC: 106 MMOL/L (ref 98–107)
CO2 SERPL-SCNC: 28.5 MMOL/L (ref 21–32)
CREAT SERPL-MCNC: 0.9 MG/DL (ref 0.6–1.3)
EOSINOPHIL NFR BLD: 1.9 % (ref 0–7)
ERYTHROCYTE [DISTWIDTH] IN BLOOD BY AUTOMATED COUNT: 12.4 % (ref 11.5–14.5)
GLOBULIN SER-MCNC: 4.1 G/L
GLUCOSE SERPL-MCNC: 94 MG/DL (ref 74–106)
HCT VFR BLD CALC: 35.5 % (ref 42–54)
HGB BLD-MCNC: 11.7 G/DL (ref 13.5–17.5)
IMM GRANULOCYTES NFR BLD: 0.2 % (ref 0–5)
INR PPP: 1.69 (ref 0.85–1.17)
LYMPHOCYTES NFR BLD AUTO: 39.3 % (ref 15–50)
MCH RBC QN AUTO: 29.9 PG (ref 26–34)
MCHC RBC AUTO-ENTMCNC: 33 G/DL (ref 31–37)
MCV RBC: 90.8 FL (ref 80–100)
MONOCYTES NFR BLD: 11 % (ref 2–11)
NEUTROPHILS NFR BLD AUTO: 47.3 % (ref 40–80)
OSMOLALITY SERPL CALC.SUM OF ELEC: 282 MOSM/KG (ref 275–300)
PLATELET # BLD: 238 10X3/UL (ref 130–400)
PMV BLD AUTO: 9.2 FL (ref 7.4–10.4)
POTASSIUM SERPL-SCNC: 3.6 MMOL/L (ref 3.5–5.1)
PROT SERPL-MCNC: 7.1 G/DL (ref 6.4–8.2)
PROTHROMBIN TIME: 19.3 SECONDS (ref 11.6–15)
RBC # BLD AUTO: 3.91 10X6/UL (ref 4.2–6.1)
SODIUM SERPL-SCNC: 141 MMOL/L (ref 136–145)
WBC # BLD AUTO: 6.3 10X3/UL (ref 4.8–10.8)

## 2019-12-13 NOTE — NUR
RECEIVED TO ROOM 2206 VIA  FROM MED 3. A/O X3. C/O PAIN TO LEFT LEG. WILL
MONITOR. FAMILY AT BEDSIDE.

## 2019-12-13 NOTE — NUR
A/O WITH NO SIGNS OF DISTRESS. IV TO THE RT AC WITH NO REDNESS OR SWELLING.
REQUESTS SOMETHING FOR PAIN. SIGNIFICANT OTHER AT BEDSIDE. DENIES NO OTHER
NEEDS AT THIS TIME. CONTINUE PLAN OF CARE.

## 2019-12-13 NOTE — MORECARE
CASE MANAGEMENT DISCHARGE SUMMARY
 
 
PATIENT: CELESTE FIERRO                      UNIT: [id number]
ACCOUNT#: [id number]                       ADM DATE: 12/10/19
AGE: 44     : 79  SEX: M            ROOM/BED: D.1209    
AUTHOR: APRIL VALDEZ                             PHYSICIAN:                               
 
REFERRING PHYSICIAN: MARTHA SEQUEIRA MD           
DATE OF SERVICE: 19
Discharge Plan
 
 
Patient Name: CELESTE FIERRO
Facility: [facility]:[place]
Encounter #: [id number]
Medical Record #: [id number]
: 1975
Planned Disposition: Home
Anticipated Discharge Date: 
 
Discharge Date: 
Expected LOS: 
Initial Reviewer: [email or web address]
Initial Review Date: 2019
Generated: 19   6:17 pm 
Comments
 
DCP- Discharge Planning
 
Updated by [id number]: Katherine Brito on 19   4:06 pm CT
CM contacted [facility] for a HOME INR MACHINE. Per [facility] representative, 
Medicaid does not cover this piece of equipment. Patient is MEDICAID PENDING.
DCP- Discharge Planning
 
Updated by [id number]: Yasmin Dickson on 19   3:30 pm CT
Patient Name: CELESTE FIERRO                                     
Admission Status: ER   
Accout number: [id number]                              
Admission Date: 12-   
: 1975                                                        
Admission Diagnosis:   
Attending: MARTHA SCOTT                                                
Current LOS:  1   
  
Anticipated DC Date:    
Planned Disposition: Home   
Primary Insurance: [place]   
  
  
 
Discharge Planning Comments: CM MET WITH PATIENT AFTER OBTAINING VERBAL 
CONSENT. STATES PLANS TO DISCHARGE TO HOME. DISCUSSED NEED FOR HH, REHAB OR 
EQUIPMENT, PATIENT STATES HE WAS JUST HERE SEVERAL WEEKS AGO AND WE GAVE HIM 
A CARD FOR FREE ELIQUIS BUT SAID THE CARD WAS NOT GOOD. HE IS BACK WITH SAME 
COMPLAINT OF DVT LEFT LEG. WE WILL NEED TO REASSESS HIS DC MED NEEDS AT TIME 
OF DISCHARGE. HE STATES HAS ALEN BUT I'M SHOWING ALEN PENDING. CM WILL FOLLOW 
AND ASSIST AS NEEDED.   
  
  
  
  
  
  
: Yasmin Dickson
 DCPIA - Discharge Planning Initial Assessment
 
Updated by [email or web address]: Yasmin Dickson on 19   4:31 pm
*  Is the patient Alert and Oriented?
Yes
*  PCP
[facility]
*  Pharmacy
[facility]
*  ADLs
Independent
*  Additional services required to return to the preadmission environment?
No
*  Can the patient safely return to the preadmission environment?
Yes
*  Has this patient been hospitalized within the prior 30 days at any 
hospital?
Yes
 
 
 
 
 
 
 
Last DP export: 19   3:41 
Patient Name: CELESTE FIERRO
 
Encounter #: [id number]
Page [id number]
 
 
 
 
 
Electronically Signed by APRIL VALDEZ on 19 at 1717
 
 
 
 
 
 
**All edits/amendments must be made on the electronic document**
 
DICTATION DATE: 19     : ELLE  19     
RPT#: [id number]                                DC DATE:        
                                               STATUS: ADM IN  
[facility]
 [place] [address]
***END OF REPORT***

## 2019-12-14 VITALS — SYSTOLIC BLOOD PRESSURE: 104 MMHG | DIASTOLIC BLOOD PRESSURE: 66 MMHG

## 2019-12-14 VITALS — SYSTOLIC BLOOD PRESSURE: 95 MMHG | DIASTOLIC BLOOD PRESSURE: 66 MMHG

## 2019-12-14 LAB
ALBUMIN SERPL-MCNC: 3.3 G/DL (ref 3.4–5)
ALP SERPL-CCNC: 88 U/L (ref 46–116)
ALT SERPL-CCNC: 21 U/L (ref 10–68)
ANION GAP SERPL CALC-SCNC: 12.1 MMOL/L (ref 8–16)
BASOPHILS NFR BLD AUTO: 0.5 % (ref 0–2)
BILIRUB SERPL-MCNC: 0.15 MG/DL (ref 0.2–1.3)
BUN SERPL-MCNC: 13 MG/DL (ref 7–18)
CALCIUM SERPL-MCNC: 9.1 MG/DL (ref 8.5–10.1)
CHLORIDE SERPL-SCNC: 106 MMOL/L (ref 98–107)
CO2 SERPL-SCNC: 26.6 MMOL/L (ref 21–32)
CREAT SERPL-MCNC: 0.7 MG/DL (ref 0.6–1.3)
EOSINOPHIL NFR BLD: 1.8 % (ref 0–7)
ERYTHROCYTE [DISTWIDTH] IN BLOOD BY AUTOMATED COUNT: 12.4 % (ref 11.5–14.5)
GLOBULIN SER-MCNC: 3.9 G/L
GLUCOSE SERPL-MCNC: 97 MG/DL (ref 74–106)
HCT VFR BLD CALC: 36.4 % (ref 42–54)
HGB BLD-MCNC: 12.1 G/DL (ref 13.5–17.5)
IMM GRANULOCYTES NFR BLD: 0 % (ref 0–5)
INR PPP: 2.36 (ref 0.85–1.17)
LYMPHOCYTES NFR BLD AUTO: 39.2 % (ref 15–50)
MCH RBC QN AUTO: 29.8 PG (ref 26–34)
MCHC RBC AUTO-ENTMCNC: 33.2 G/DL (ref 31–37)
MCV RBC: 89.7 FL (ref 80–100)
MONOCYTES NFR BLD: 9.2 % (ref 2–11)
NEUTROPHILS NFR BLD AUTO: 49.3 % (ref 40–80)
OSMOLALITY SERPL CALC.SUM OF ELEC: 280 MOSM/KG (ref 275–300)
PLATELET # BLD: 233 10X3/UL (ref 130–400)
PMV BLD AUTO: 9 FL (ref 7.4–10.4)
POTASSIUM SERPL-SCNC: 3.7 MMOL/L (ref 3.5–5.1)
PROT SERPL-MCNC: 7.2 G/DL (ref 6.4–8.2)
PROTHROMBIN TIME: 25.1 SECONDS (ref 11.6–15)
RBC # BLD AUTO: 4.06 10X6/UL (ref 4.2–6.1)
SODIUM SERPL-SCNC: 141 MMOL/L (ref 136–145)
WBC # BLD AUTO: 6.6 10X3/UL (ref 4.8–10.8)

## 2019-12-14 NOTE — NUR
PT RESTING IN BED WITH EYES CLOSED.  AROUSES WITH NAME CALLED.  PT DENIES
PAIN AT THIS TIME.  RESP EVEN AND UNLABORED.  SALINE LOC TO RIGHT AC INTACT.
SITE WITHOUT REDNESS OR EDEMA.  DENIES FURTHER NEEDS AT THIS TIME.  CL WITHIN
REACH.  ENCOURAGED TO CALL WITH NEEDS.  CONTINUE POC

## 2019-12-15 NOTE — MORECARE
CASE MANAGEMENT DISCHARGE SUMMARY
 
 
PATIENT: CELESTE FIERRO                      UNIT: [id number]
ACCOUNT#: [id number]                       ADM DATE: 12/10/19
AGE: 44     : 79  SEX: M            ROOM/BED: D.2206    
AUTHOR: APRIL VALDEZ                             PHYSICIAN:                               
 
REFERRING PHYSICIAN: MARTHA SEQUEIRA MD           
DATE OF SERVICE: 12/15/19
Discharge Plan
 
 
Patient Name: CELESTE FIERRO
Facility: [facility]:[place]
Encounter #: [id number]
Medical Record #: [id number]
: 1975
Planned Disposition: Home
Anticipated Discharge Date: 
 
Discharge Date: 2019
Expected LOS: 
Initial Reviewer: [email or web address]
Initial Review Date: 2019
Generated: 12/15/19   3:00 pm 
DCP- Discharge Planning
 
Updated by [email or web address]: Katherine Brito on 19   4:06 pm CT
CM contacted [facility] for a HOME INR MACHINE. Per [facility] representative, 
Medicaid does not cover this piece of equipment. Patient is MEDICAID PENDING.
DCP- Discharge Planning
 
Updated by [email or web address]: Yasmin Dickson on 19   3:30 pm CT
Patient Name: CELESTE FIERRO                                     
Admission Status: ER   
Accout number: [id number]                              
Admission Date: 12-   
: 1975                                                        
Admission Diagnosis:   
Attending: MARTHA SCOTT                                                
Current LOS:  1   
  
Anticipated DC Date:    
Planned Disposition: Home   
Primary Insurance: [place]   
  
  
Discharge Planning Comments: CM MET WITH PATIENT AFTER OBTAINING VERBAL 
CONSENT. STATES PLANS TO DISCHARGE TO HOME. DISCUSSED NEED FOR HH, REHAB OR 
 
EQUIPMENT, PATIENT STATES HE WAS JUST HERE SEVERAL WEEKS AGO AND WE GAVE HIM 
A CARD FOR FREE ELIQUIS BUT SAID THE CARD WAS NOT GOOD. HE IS BACK WITH SAME 
COMPLAINT OF DVT LEFT LEG. WE WILL NEED TO REASSESS HIS DC MED NEEDS AT TIME 
OF DISCHARGE. HE STATES HAS ALEN BUT I'M SHOWING ALEN PENDING. CM WILL FOLLOW 
AND ASSIST AS NEEDED.   
  
  
  
  
  
  
: Yasmin Dickson
 DCPIA - Discharge Planning Initial Assessment
 
Updated by [email or web address]: Yasmin Dickson on 19   4:31 pm
*  Is the patient Alert and Oriented?
Yes
*  PCP
[facility]
*  Pharmacy
[place]
*  ADLs
Independent
*  Additional services required to return to the preadmission environment?
No
*  Can the patient safely return to the preadmission environment?
Yes
*  Has this patient been hospitalized within the prior 30 days at any 
hospital?
Yes
 
 
 
 
 
 
 
Last DP export: 19   4:17 
Patient Name: CELESTE FIERRO
 
Encounter #: [id number]
Page [id number]
 
 
 
 
 
Electronically Signed by APRIL VALDEZ on 12/15/19 at 1401
 
 
 
 
 
 
**All edits/amendments must be made on the electronic document**
 
DICTATION DATE: 12/15/19 1400     : ELLE  12/15/19 1400     
RPT#: [id number]                                DC DATE:19
                                               STATUS: DIS IN  
[facility]
[address] [place] [address]
***END OF REPORT***

## 2020-01-27 ENCOUNTER — HOSPITAL ENCOUNTER (EMERGENCY)
Dept: HOSPITAL 84 - D.ER | Age: 45
LOS: 1 days | Discharge: HOME | End: 2020-01-28
Payer: SELF-PAY

## 2020-01-27 VITALS — HEIGHT: 72 IN | WEIGHT: 175.37 LBS | BODY MASS INDEX: 23.75 KG/M2

## 2020-01-27 DIAGNOSIS — K40.90: ICD-10-CM

## 2020-01-27 DIAGNOSIS — Z72.0: ICD-10-CM

## 2020-01-27 DIAGNOSIS — A64: Primary | ICD-10-CM

## 2020-01-27 DIAGNOSIS — I10: ICD-10-CM

## 2020-01-27 DIAGNOSIS — R30.0: ICD-10-CM

## 2020-01-27 LAB
ALBUMIN SERPL-MCNC: 3.7 G/DL (ref 3.4–5)
ALP SERPL-CCNC: 99 U/L (ref 30–120)
ALT SERPL-CCNC: 22 U/L (ref 10–68)
ANION GAP SERPL CALC-SCNC: 1.2 MMOL/L (ref 8–16)
APPEARANCE UR: CLEAR
BASOPHILS NFR BLD AUTO: 0.4 % (ref 0–2)
BILIRUB SERPL-MCNC: 0.25 MG/DL (ref 0.2–1.3)
BILIRUB SERPL-MCNC: NEGATIVE MG/DL
BUN SERPL-MCNC: 18 MG/DL (ref 7–18)
CALCIUM SERPL-MCNC: 8.9 MG/DL (ref 8.5–10.1)
CHLORIDE SERPL-SCNC: 102 MMOL/L (ref 98–107)
CO2 SERPL-SCNC: 32.8 MMOL/L (ref 21–32)
COLOR UR: YELLOW
CREAT SERPL-MCNC: 1 MG/DL (ref 0.6–1.3)
EOSINOPHIL NFR BLD: 1.5 % (ref 0–7)
ERYTHROCYTE [DISTWIDTH] IN BLOOD BY AUTOMATED COUNT: 13.4 % (ref 11.5–14.5)
GLOBULIN SER-MCNC: 3.6 G/L
GLUCOSE SERPL-MCNC: 94 MG/DL (ref 74–106)
GLUCOSE SERPL-MCNC: NEGATIVE MG/DL
HCT VFR BLD CALC: 37.2 % (ref 42–54)
HGB BLD-MCNC: 12 G/DL (ref 13.5–17.5)
IMM GRANULOCYTES NFR BLD: 0.2 % (ref 0–5)
KETONES UR STRIP-MCNC: NEGATIVE MG/DL
LYMPHOCYTES NFR BLD AUTO: 46.3 % (ref 15–50)
MCH RBC QN AUTO: 29.5 PG (ref 26–34)
MCHC RBC AUTO-ENTMCNC: 32.3 G/DL (ref 31–37)
MCV RBC: 91.4 FL (ref 80–100)
MONOCYTES NFR BLD: 9.1 % (ref 2–11)
NEUTROPHILS NFR BLD AUTO: 42.5 % (ref 40–80)
NITRITE UR-MCNC: NEGATIVE MG/ML
OSMOLALITY SERPL CALC.SUM OF ELEC: 266 MOSM/KG (ref 275–300)
PH UR STRIP: 6 [PH] (ref 5–6)
PLATELET # BLD: 190 10X3/UL (ref 130–400)
PMV BLD AUTO: 9.3 FL (ref 7.4–10.4)
POTASSIUM SERPL-SCNC: 4 MMOL/L (ref 3.5–5.1)
PROT SERPL-MCNC: 7.3 G/DL (ref 6.4–8.2)
PROT UR-MCNC: NEGATIVE MG/DL
RBC # BLD AUTO: 4.07 10X6/UL (ref 4.2–6.1)
SODIUM SERPL-SCNC: 132 MMOL/L (ref 136–145)
SP GR UR STRIP: 1.01 (ref 1–1.02)
UROBILINOGEN UR-MCNC: NORMAL MG/DL
WBC # BLD AUTO: 4.8 10X3/UL (ref 4.8–10.8)

## 2020-01-28 VITALS — SYSTOLIC BLOOD PRESSURE: 140 MMHG | DIASTOLIC BLOOD PRESSURE: 97 MMHG

## 2020-03-12 NOTE — [facility]
CASE MANAGEMENT DISCHARGE SUMMARY
 
 
PATIENT: CELESTE FIERRO                      UNIT: [id number]
ACCOUNT#: [id number]                       ADM DATE: 19
AGE: 43     : [birth year]  SEX: M            ROOM/BED: D.1208    
AUTHOR: JOSÉ MIGUELDOC                             PHYSICIAN:                               
 
REFERRING PHYSICIAN: HARDIK HAAS MD              
DATE OF SERVICE: 19
Discharge Plan
 
 
Patient Name: CELESTE FIERRO
[facility]
Encounter #: [id number]
Medical Record #: [id number]
: 1975
Planned Disposition: Home
Anticipated Discharge Date: 19
 
Discharge Date: 
Expected LOS: 1
Initial Reviewer: [id number]
Initial Review Date: 2019
Generated: 19   4:47 pm 
DCP- Discharge Planning
 
Updated by [id number]: Yasmin Dickson on 19   2:51 pm CT
Patient Name: CELESTE FIERRO                                     
Admission Status: ER   
Accout number: [id number]                              
Admission Date: 2019   
: 1975                                                        
Admission Diagnosis:   
Attending: HARDIK CABRERA                                                
Current LOS:  1   
  
Anticipated DC Date:    
Planned Disposition:    
Primary Insurance: UNINSURED DISCOUNT PLAN   
  
  
Discharge Planning Comments: CM CALLED [employer] AND LEFT A MSG TO FOLLOW UP 
WITH THIS PATIENT TO HELP WITH INSURANCE APPLICATION. KAYKAY FROM [employer] MET 
WITH PATIENT TODAY. COUMADIN IS AVAILABLE AT [facility] FOR 4 DOLLARS THROUGH 
GOOD RX. I WILL MEET WITH PATIENT AND GIVE HIM A GOOD RX PRESCRIPTION DRUG 
CARD. CM WILL FOLLOW AND ASSIST AS NEEDED.   
  
  
 
  
  
  
  
: aYsmin Dickson
 DCPIA - Discharge Planning Initial Assessment
 
Updated by [id number]: Raj Hummel on 19   3:44 pm
*  Is the patient Alert and Oriented?
Yes
*  How many steps to enter\exit or inside your home? NONE *  PCP NONE *  Pharmacy [facility]
ON [place]
*  Preadmission Environment
Homeless
*  Other Environment
LIVING IN TENT WITH GIRLFRIEND
*  Facility Name
NONE
*  ADLs
Independent
*  Equipment
None
*  Other Equipment
NO MEDICAL EQUIPMENT PROVIDER PREFERENCE
*  List name and contact numbers for known caregivers / representatives who 
currently or will assist patient after discharge:
CAM PEÑA, FRIEND, [phone]
*  Verbal permission to speak to the caregivers and representatives has been 
obtained from the patient.
N/A
*  Community resources currently utilized
None
*  Please name any agencies selected above.
NONE
*  Additional services required to return to the preadmission environment?
No
*  Can the patient safely return to the preadmission environment?
Yes
*  Has this patient been hospitalized within the prior 30 days at any 
hospital?
No
 
 
 
 
 
 
 
Last DP export: 19   2:58 
Patient Name: CELESTE FIERRO
 
Encounter #: [id number]
Page [id number]
 
 
 
 
 
Electronically Signed by APRIL VALDEZ on 19 at 1547
 
 
 
 
 
 
**All edits/amendments must be made on the electronic document**
 
DICTATION DATE: 19     : ELLE  19     
RPT#: [id number]                                DC DATE:        
                                               STATUS: ADM IN  
[facility]
 [place] [address]
***END OF REPORT***
CASE MANAGEMENT DISCHARGE SUMMARY
 
 
PATIENT: CELESTE FIERRO                      UNIT: [id number]
ACCOUNT#: [id number]                       ADM DATE: 19
AGE: 43     : 79  SEX: M            ROOM/BED: D.1208    
AUTHOR: APRIL VALDEZ                             PHYSICIAN:                               
 
REFERRING PHYSICIAN: HARDIK HAAS MD              
DATE OF SERVICE: 19
Discharge Plan
 
 
Patient Name: CELESTE FIERRO
Facility: [facility]:[place]
Encounter #: [id number]
Medical Record #: [id number]
: 1975
Planned Disposition: Home
Anticipated Discharge Date: 19
 
Discharge Date: 
Expected LOS: 1
Initial Reviewer: [id number]
Initial Review Date: 2019
Generated: 19   8:13 am 
Comments
 
DCP- Discharge Planning
 
Updated by [id number]: Raj Hummel on 19   2:51 pm CT
Patient Name: CELESTE FIERRO                                     
Admission Status: ER   
Accout number: [id number]                              
Admission Date: 2019   
: 1975                                                        
Admission Diagnosis:   
Attending: HARDIK CABRERA                                                
Current LOS:  1   
  
Anticipated DC Date: 2019   
Planned Disposition: Home   
Primary Insurance: MEDICAID [place]   
  
  
Discharge Planning Comments:   
CM RECEIVED ORDER FOR MEDICATION ASSISTANCE.  CM MET WITH PT IN ROOM TO 
DISCUSS DISCHARGE PLANNING AND NEEDS. PT REPORTS LIVING IN A TENT WITH HIS 
GIRLFRIEND.  THE TENT IS CLOSE TO [facility].  PT IS  
INDEPENDENT IN HIS CARE. PT HAS NO MEDICAL EQUIPMENT AND NO OUTSIDE SERVICES 
 
ASSISTING IN THE HOME. CM DISCUSSED AVAILABILITY OF HOME HEALTH, REHAB 
SERVICES AND MEDICAL EQUIPMENT. PT DENIES DISCHARGE NEEDS OTHER THAN HELP 
WITH ELIQUIS.  PT HAS APPLIED FOR STATE MEDICAID AND IS WAITING FOR APPROVAL. 
 CM PROVIDED PT WITH ELIQUIS FREE 30 DAY PRESCRIPTION CARD AND $10 COPAY CARD.
  PT  REPORTS HE HAS $10 AND WILL EITHER TAKE A TAXI OR BUS FOR DISCHARGE 
HOME.  BEDSIDE NURSE NOTIFIED.  
  
PT PLANS TO DISCHARGE TO HIS TENT WITH HIS GIRLFRIEND, HE WILL TAKE TAXI OR 
BUS AT DISCHARGE AND HAS FUNDS FOR THIS.  PT WILL NEED TWO PRESCRIPTIONS FOR 
ELIQUIS, ONE FOR 30 DAYS (TO USE THE FREE 30 DAY SUPPLY CARD) AND ONE FOR 
REFILLS ( TO USE THE $10 COPAY CARD) WITH.  CM TO CONTINUE TO FOLLOW AND 
ASSIST IF NEEDED.  
  
  
: Raj Hummel
DCP- Discharge Planning
 
Updated by [id number]: Yasminlloyd Dickson on 19   2:51 pm CT
Patient Name: CELESTE FIERRO                                     
Admission Status: ER   
Accout number: [id number]                              
Admission Date: 2019   
: 1975                                                        
Admission Diagnosis:   
Attending: HARDIK CABRERA                                                
Current LOS:  1   
  
Anticipated DC Date:    
Planned Disposition:    
Primary Insurance: UNINSURED DISCOUNT PLAN   
  
  
Discharge Planning Comments: CM CALLED MED DATA AND LEFT A MSG TO FOLLOW UP 
WITH THIS PATIENT TO HELP WITH INSURANCE APPLICATION. KAYKAY FROM [employer] MET 
WITH PATIENT TODAY. COUMADIN IS AVAILABLE AT [facility] FOR 4 DOLLARS THROUGH 
GOOD RX. I WILL MEET WITH PATIENT AND GIVE HIM A GOOD RX PRESCRIPTION DRUG 
CARD. CM WILL FOLLOW AND ASSIST AS NEEDED.   
  
  
  
  
  
  
: Yasmin Dickson
 DCPIA - Discharge Planning Initial Assessment
 
Updated by [id number]: Raj Hummel on 19   3:44 pm
*  Is the patient Alert and Oriented?
Yes
*  How many steps to enter\exit or inside your home? NONE *  PCP NONE *  Pharmacy [facility]
*  Preadmission Environment
 
Homeless
*  Other Environment
LIVING IN TENT WITH GIRLFRIEND
*  Facility Name
NONE
*  ADLs
Independent
*  Equipment
None
*  Other Equipment
NO MEDICAL EQUIPMENT PROVIDER PREFERENCE
*  List name and contact numbers for known caregivers / representatives who 
currently or will assist patient after discharge:
CAM POMPA, TRICE, [phone]
*  Verbal permission to speak to the caregivers and representatives has been 
obtained from the patient.
N/A
*  Community resources currently utilized
None
*  Please name any agencies selected above.
NONE
*  Additional services required to return to the preadmission environment?
No
*  Can the patient safely return to the preadmission environment?
Yes
*  Has this patient been hospitalized within the prior 30 days at any 
hospital?
No
 
 
 
 
 
 
 
Last DP export: 19   2:56 
Patient Name: CELESTE FIERRO
 
Encounter #: [id number]
Page [id number]
 
 
 
 
 
Electronically Signed by APRIL VALDEZ on 19 at 0714
 
 
 
 
 
 
**All edits/amendments must be made on the electronic document**
 
DICTATION DATE: 19     : ELLE  19     
RPT#: [id number]                                DC DATE:        
                                               STATUS: ADM IN  
[address] [address] [place] [address]
***END OF REPORT***
CASE MANAGEMENT DISCHARGE SUMMARY
 
 
PATIENT: CELESTE FIERRO                      UNIT: [id number]
ACCOUNT#: [id number]                       ADM DATE: 19
AGE: 43     : [birth year]  SEX: M            ROOM/BED: D.1208    
AUTHOR: APRIL VALDEZ                             PHYSICIAN:                               
 
REFERRING PHYSICIAN: HARDIK HAAS MD              
DATE OF SERVICE: 19
Discharge Plan
 
 
Patient Name: CELESTE FIERRO
[facility]
Encounter #: [id number]
Medical Record #: [id number]
: 1975
Planned Disposition: 
Anticipated Discharge Date: 
 
Discharge Date: 
Expected LOS: 
Initial Reviewer: [id number]
Initial Review Date: 2019
Generated: 19   4:50 pm 
  
 
 
 
 
 
 
 
Patient Name: CELESTE FIERRO
 
Encounter #: [id number]
Page [id number]
 
 
 
 
 
Electronically Signed by APRIL VALDEZ on 19 at 1551
 
 
 
 
 
 
**All edits/amendments must be made on the electronic document**
 
DICTATION DATE: 19     : ELLE  19     
RPT#: [id number]                                DC DATE:        
                                               STATUS: ADM IN  
[facility]
[address] [place] [address]
***END OF REPORT***
CASE MANAGEMENT DISCHARGE SUMMARY
 
 
PATIENT: CELESTE FIERRO                      UNIT: [id number]
ACCOUNT#: [id number]                       ADM DATE: 19
AGE: 43     : [birth year]  SEX: M            ROOM/BED: D.1208    
AUTHOR: APRIL VALDEZ                             PHYSICIAN:                               
 
REFERRING PHYSICIAN: HARDIK HAAS MD              
DATE OF SERVICE: 19
Discharge Plan
 
 
Patient Name: CELESTE FIERRO
Facility: [facility]:[place]
Encounter #: [id number]
Medical Record #: [id number]
: 1975
Planned Disposition: Home
Anticipated Discharge Date: 19
 
Discharge Date: 
Expected LOS: 1
Initial Reviewer: [id number]
Initial Review Date: 2019
Generated: 19   4:56 pm 
Comments
 
DCP- Discharge Planning
 
Updated by [id number]: Raj Hummel on 19   2:51 pm CT
Patient Name: CELESTE FIERRO                                     
Admission Status: ER   
Accout number: [id number]                              
Admission Date: 2019   
: 1975                                                        
Admission Diagnosis:   
Attending: HARDIK CABRERA                                                
Current LOS:  1   
  
Anticipated DC Date: 2019   
Planned Disposition: Home   
Primary Insurance: MEDICAID [place]END[place]   
  
  
Discharge Planning Comments:   
CM RECEIVED ORDER FOR MEDICATION ASSISTANCE.  CM MET WITH PT IN ROOM TO 
DISCUSS DISCHARGE PLANNING AND NEEDS. PT REPORTS LIVING IN A TENT WITH HIS 
GIRLFRIEND.  THE TENT IS CLOSE TO [facility].  PT IS  
INDEPENDENT IN HIS CARE. PT HAS NO MEDICAL EQUIPMENT AND NO OUTSIDE SERVICES 
 
ASSISTING IN THE HOME. CM DISCUSSED AVAILABILITY OF HOME HEALTH, REHAB 
SERVICES AND MEDICAL EQUIPMENT. PT DENIES DISCHARGE NEEDS OTHER THAN HELP 
WITH ELIQUIS.  PT HAS APPLIED FOR STATE MEDICAID AND IS WAITING FOR APPROVAL. 
 CM PROVIDED PT WITH ELIQUIS FREE 30 DAY PRESCRIPTION CARD AND $10 COPAY CARD.
  PT  REPORTS HE HAS $10 AND WILL EITHER TAKE A TAXI OR BUS FOR DISCHARGE 
HOME.  BEDSIDE NURSE NOTIFIED.  
  
PT PLANS TO DISCHARGE TO HIS TENT WITH HIS GIRLFRIEND, HE WILL TAKE TAXI OR 
BUS AT DISCHARGE AND HAS FUNDS FOR THIS.  PT WILL NEED TWO PRESCRIPTIONS FOR 
ELIQUIS, ONE FOR 30 DAYS (TO USE THE FREE 30 DAY SUPPLY CARD) AND ONE FOR 
REFILLS ( TO USE THE $10 COPAY CARD) WITH.  CM TO CONTINUE TO FOLLOW AND 
ASSIST IF NEEDED.  
  
  
: Raj Hummel
DCP- Discharge Planning
 
Updated by [id number]: Yasminlloyd Dickson on 19   2:51 pm CT
Patient Name: CELESTE FIERRO                                     
Admission Status: ER   
Accout number: [id number]                              
Admission Date: 2019   
: 1975                                                        
Admission Diagnosis:   
Attending: HARDIK CABRERA                                                
Current LOS:  1   
  
Anticipated DC Date:    
Planned Disposition:    
Primary Insurance: UNINSURED DISCOUNT PLAN   
  
  
Discharge Planning Comments: CM CALLED [employer] AND LEFT A MSG TO FOLLOW UP 
WITH THIS PATIENT TO HELP WITH INSURANCE APPLICATION. KAYKAY FROM [employer] MET 
WITH PATIENT TODAY. COUMADIN IS AVAILABLE AT [facility] FOR 4 DOLLARS THROUGH 
GOOD RX. I WILL MEET WITH PATIENT AND GIVE HIM A GOOD RX PRESCRIPTION DRUG 
CARD. CM WILL FOLLOW AND ASSIST AS NEEDED.   
  
  
  
  
  
  
: Yasmin Dickson
 DCPIA - Discharge Planning Initial Assessment
 
Updated by [id number]: Raj Hummel on 19   3:44 pm
*  Is the patient Alert and Oriented?
Yes
*  How many steps to enter\exit or inside your home? NONE *  PCP NONE *  Pharmacy [facility]
*  Preadmission Environment
 
Homeless
*  Other Environment
LIVING IN TENT WITH GIRLFRIEND
*  Facility Name
NONE
*  ADLs
Independent
*  Equipment
None
*  Other Equipment
NO MEDICAL EQUIPMENT PROVIDER PREFERENCE
*  List name and contact numbers for known caregivers / representatives who 
currently or will assist patient after discharge:
CAM POMPA, TRICE, [phone]
*  Verbal permission to speak to the caregivers and representatives has been 
obtained from the patient.
N/A
*  Community resources currently utilized
None
*  Please name any agencies selected above.
NONE
*  Additional services required to return to the preadmission environment?
No
*  Can the patient safely return to the preadmission environment?
Yes
*  Has this patient been hospitalized within the prior 30 days at any 
hospital?
No
 
 
 
 
 
 
 
Last DP export: 19   2:47 
Patient Name: CELESTE FIERRO
 
Encounter #: [id number]
Page 79127
 
 
 
 
 
Electronically Signed by APRIL VALDEZ on 19 at 1556
 
 
 
 
 
 
**All edits/amendments must be made on the electronic document**
 
DICTATION DATE: 19     : ELLE  19     
RPT#: [id number]                                DC DATE:        
                                               STATUS: ADM IN  
[address] [address] [place] [address]
***END OF REPORT***
CASE MANAGEMENT DISCHARGE SUMMARY
 
 
PATIENT: CELESTE FIERRO                      UNIT: [id number]
ACCOUNT#: [id number]                       ADM DATE: 19
AGE: 43     : 79  SEX: M            ROOM/BED: D.1208    
AUTHOR: APRIL VALDEZ                             PHYSICIAN:                               
 
REFERRING PHYSICIAN: HARDIK HAAS MD              
DATE OF SERVICE: 19
Discharge Plan
 
 
Patient Name: CELESTE FIERRO
Facility: [facility]:[place]
Encounter #: [id number]
Medical Record #: [id number]
: 1975
Planned Disposition: Home
Anticipated Discharge Date: 19
 
Discharge Date: 2019
Expected LOS: 2
Initial Reviewer: [id number]
Initial Review Date: 2019
Generated: 19  12:15 pm 
Comments
 
DCP- Discharge Planning
 
Updated by [id number]: Raj Hummel on 19   2:51 pm CT
Patient Name: CELESTE FIERRO                                     
Admission Status: ER   
Accout number: [id number]                              
Admission Date: 2019   
: 1975                                                        
Admission Diagnosis:   
Attending: HARDIK CABRERA                                                
Current LOS:  1   
  
Anticipated DC Date: 2019   
Planned Disposition: Home   
Primary Insurance: MEDICAID [place]END[place]   
  
  
Discharge Planning Comments:   
CM RECEIVED ORDER FOR MEDICATION ASSISTANCE.  CM MET WITH PT IN ROOM TO 
DISCUSS DISCHARGE PLANNING AND NEEDS. PT REPORTS LIVING IN A TENT WITH HIS 
GIRLFRIEND.  THE TENT IS CLOSE TO [facility] ON [facility].  PT IS  
INDEPENDENT IN HIS CARE. PT HAS NO MEDICAL EQUIPMENT AND NO OUTSIDE SERVICES 
 
ASSISTING IN THE HOME. CM DISCUSSED AVAILABILITY OF HOME HEALTH, REHAB 
SERVICES AND MEDICAL EQUIPMENT. PT DENIES DISCHARGE NEEDS OTHER THAN HELP 
WITH ELIQUIS.  PT HAS APPLIED FOR STATE MEDICAID AND IS WAITING FOR APPROVAL. 
 CM PROVIDED PT WITH ELIQUIS FREE 30 DAY PRESCRIPTION CARD AND $10 COPAY CARD.
  PT  REPORTS HE HAS $10 AND WILL EITHER TAKE A TAXI OR BUS FOR DISCHARGE 
HOME.  BEDSIDE NURSE NOTIFIED.  
  
PT PLANS TO DISCHARGE TO HIS TENT WITH HIS GIRLFRIEND, HE WILL TAKE TAXI OR 
BUS AT DISCHARGE AND HAS FUNDS FOR THIS.  PT WILL NEED TWO PRESCRIPTIONS FOR 
ELIQUIS, ONE FOR 30 DAYS (TO USE THE FREE 30 DAY SUPPLY CARD) AND ONE FOR 
REFILLS ( TO USE THE $10 COPAY CARD) WITH.  CM TO CONTINUE TO FOLLOW AND 
ASSIST IF NEEDED.  
  
  
: Raj Hummel
DCP- Discharge Planning
 
Updated by [id number]: Yasmin Yessi on 19   2:51 pm CT
Patient Name: CELESTE FIERRO                                     
Admission Status: ER   
Accout number: [id number]                              
Admission Date: 2019   
: 1975                                                        
Admission Diagnosis:   
Attending: HARDIK CABRERA                                                
Current LOS:  1   
  
Anticipated DC Date:    
Planned Disposition:    
Primary Insurance: UNINSURED DISCOUNT PLAN   
  
  
Discharge Planning Comments: CM CALLED [employer] AND LEFT A MSG TO FOLLOW UP 
WITH THIS PATIENT TO HELP WITH INSURANCE APPLICATION. KAYKAY FROM [employer] MET 
WITH PATIENT TODAY. COUMADIN IS AVAILABLE AT [facility] FOR 4 DOLLARS THROUGH 
GOOD RX. I WILL MEET WITH PATIENT AND GIVE HIM A GOOD RX PRESCRIPTION DRUG 
CARD. CM WILL FOLLOW AND ASSIST AS NEEDED.   
  
  
  
  
  
  
: Yasmin Dickson
 DCPIA - Discharge Planning Initial Assessment
 
Updated by [id number]: Raj Hummel on 19   3:44 pm
*  Is the patient Alert and Oriented?
Yes
*  How many steps to enter\exit or inside your home? NONE *  PCP NONE *  Pharmacy [facility]
ON [place]
*  Preadmission Environment
 
Homeless
*  Other Environment
LIVING IN TENT WITH GIRLFRIEND
*  Facility Name
NONE
*  ADLs
Independent
*  Equipment
None
*  Other Equipment
NO MEDICAL EQUIPMENT PROVIDER PREFERENCE
*  List name and contact numbers for known caregivers / representatives who 
currently or will assist patient after discharge:
CAM POMPA, FRIEND, [phone]
*  Verbal permission to speak to the caregivers and representatives has been 
obtained from the patient.
N/A
*  Community resources currently utilized
None
*  Please name any agencies selected above.
NONE
*  Additional services required to return to the preadmission environment?
No
*  Can the patient safely return to the preadmission environment?
Yes
*  Has this patient been hospitalized within the prior 30 days at any 
hospital?
No
 
 
 
 
 
 
 
Last DP export: 19   6:14 
Patient Name: CELESTE FIERRO
 
Encounter #: [id number]
Page 41514
 
 
 
 
 
Electronically Signed by APRIL VALDEZ on 19 at 1115
 
 
 
 
 
 
**All edits/amendments must be made on the electronic document**
 
DICTATION DATE: 19 1115     : ELLE  19 1115     
RPT#: [id number]                                DC DATE:19
                                               STATUS: DIS IN  
[address] [place] [address]
***END OF REPORT***
CASE MANAGEMENT DISCHARGE SUMMARY
 
 
PATIENT: CELESTE FIERRO                      UNIT: [id number]
ACCOUNT#: [id number]                       ADM DATE: 19
AGE: 43     : [birth year]  SEX: M            ROOM/BED: D.1208    
AUTHOR: APRIL VALDEZ                             PHYSICIAN:                               
 
REFERRING PHYSICIAN: HARDIK HAAS MD              
DATE OF SERVICE: 19
Discharge Plan
 
 
Patient Name: CELESTE FIERRO
[facility]
Encounter #: [id number]
Medical Record #: [id number]
: 1975
Planned Disposition: 
Anticipated Discharge Date: 
 
Discharge Date: 
Expected LOS: 
Initial Reviewer: [id number]
Initial Review Date: 2019
Generated: 19   4:57 pm 
Comments
 
DCP- Discharge Planning
 
Updated by [id number]: Yasmin Dickson on 19   2:51 pm CT
Patient Name: CELESTE FIERRO                                     
Admission Status: ER   
Accout number: [id number]                              
Admission Date: 2019   
: 1975                                                        
Admission Diagnosis:   
Attending: HARDIK CABRERA                                                
Current LOS:  1   
  
Anticipated DC Date:    
Planned Disposition:    
Primary Insurance: UNINSURED DISCOUNT PLAN   
  
  
Discharge Planning Comments: CM CALLED [employer] AND LEFT A MSG TO FOLLOW UP 
WITH THIS PATIENT TO HELP WITH INSURANCE APPLICATION. KAYKAY FROM [employer] DATA MET 
WITH PATIENT TODAY. COUMADIN IS AVAILABLE AT [facility] FOR 4 DOLLARS THROUGH 
GOOD RX. I WILL MEET WITH PATIENT AND GIVE HIM A GOOD RX PRESCRIPTION DRUG 
CARD. CM WILL FOLLOW AND ASSIST AS NEEDED.   
 
  
  
  
  
  
  
: Yasmin Dickson
  
 
 
 
 
 
 
 
 
Last DP export: 19   2:51 
Patient Name: CELESTE FIERRO
Encounter #: [id number]
Page 28201
 
 
 
 
 
Electronically Signed by APRIL VALDEZ on 19 at 1558
 
 
 
 
 
 
**All edits/amendments must be made on the electronic document**
 
DICTATION DATE: 19     : ELLE  19 155     
RPT#: [id number]                                DC DATE:        
                                               STATUS: ADM IN  
[facility]
 [place] [address]
***END OF REPORT***
1